# Patient Record
Sex: MALE | Race: WHITE | NOT HISPANIC OR LATINO | Employment: OTHER | URBAN - METROPOLITAN AREA
[De-identification: names, ages, dates, MRNs, and addresses within clinical notes are randomized per-mention and may not be internally consistent; named-entity substitution may affect disease eponyms.]

---

## 2021-04-01 ENCOUNTER — HOSPITAL ENCOUNTER (EMERGENCY)
Facility: HOSPITAL | Age: 61
Discharge: HOME | End: 2021-04-01
Attending: EMERGENCY MEDICINE
Payer: COMMERCIAL

## 2021-04-01 VITALS
OXYGEN SATURATION: 97 % | WEIGHT: 204.38 LBS | HEIGHT: 69 IN | SYSTOLIC BLOOD PRESSURE: 124 MMHG | HEART RATE: 65 BPM | DIASTOLIC BLOOD PRESSURE: 74 MMHG | TEMPERATURE: 98 F | RESPIRATION RATE: 18 BRPM | BODY MASS INDEX: 30.27 KG/M2

## 2021-04-01 DIAGNOSIS — S16.1XXA CERVICAL STRAIN, ACUTE, INITIAL ENCOUNTER: Primary | ICD-10-CM

## 2021-04-01 DIAGNOSIS — S09.90XA HEAD TRAUMA: ICD-10-CM

## 2021-04-01 PROCEDURE — 99284 EMERGENCY DEPT VISIT MOD MDM: CPT | Mod: 25

## 2021-04-01 RX ORDER — CYCLOBENZAPRINE HCL 10 MG
10 TABLET ORAL 3 TIMES DAILY PRN
Qty: 15 TABLET | Refills: 0 | Status: SHIPPED | OUTPATIENT
Start: 2021-04-01 | End: 2021-04-06

## 2021-04-01 RX ORDER — HYDROCODONE BITARTRATE AND ACETAMINOPHEN 5; 325 MG/1; MG/1
1 TABLET ORAL EVERY 6 HOURS PRN
Qty: 12 TABLET | Refills: 0 | Status: SHIPPED | OUTPATIENT
Start: 2021-04-01 | End: 2021-04-11

## 2022-11-04 PROBLEM — Z12.11 SCREENING COLON: Status: ACTIVE | Noted: 2022-11-04

## 2022-12-02 ENCOUNTER — OFFICE (OUTPATIENT)
Dept: URBAN - METROPOLITAN AREA CLINIC 102 | Facility: CLINIC | Age: 62
End: 2022-12-02

## 2022-12-02 ENCOUNTER — HOSPITAL ENCOUNTER (OUTPATIENT)
Dept: RADIOLOGY | Facility: HOSPITAL | Age: 62
Discharge: HOME | End: 2022-12-02
Attending: EMERGENCY MEDICINE
Payer: COMMERCIAL

## 2022-12-02 DIAGNOSIS — M25.561 KNEE PAIN, RIGHT: ICD-10-CM

## 2022-12-02 DIAGNOSIS — M25.561 KNEE PAIN, RIGHT: Primary | ICD-10-CM

## 2022-12-02 PROCEDURE — 73721 MRI JNT OF LWR EXTRE W/O DYE: CPT | Mod: 26,RT,, | Performed by: RADIOLOGY

## 2022-12-02 PROCEDURE — 73721 MRI KNEE WITHOUT CONTRAST RIGHT: ICD-10-PCS | Mod: 26,RT,, | Performed by: RADIOLOGY

## 2022-12-02 PROCEDURE — 00031: CPT | Performed by: INTERNAL MEDICINE

## 2022-12-02 PROCEDURE — 73721 MRI JNT OF LWR EXTRE W/O DYE: CPT | Mod: TC,RT

## 2022-12-05 ENCOUNTER — OFFICE (OUTPATIENT)
Dept: URBAN - METROPOLITAN AREA CLINIC 98 | Facility: CLINIC | Age: 62
End: 2022-12-05

## 2022-12-05 VITALS
DIASTOLIC BLOOD PRESSURE: 82 MMHG | HEART RATE: 77 BPM | TEMPERATURE: 97.3 F | DIASTOLIC BLOOD PRESSURE: 71 MMHG | HEART RATE: 75 BPM | HEART RATE: 70 BPM | SYSTOLIC BLOOD PRESSURE: 113 MMHG | RESPIRATION RATE: 14 BRPM | RESPIRATION RATE: 9 BRPM | HEIGHT: 69 IN | RESPIRATION RATE: 16 BRPM | OXYGEN SATURATION: 100 % | SYSTOLIC BLOOD PRESSURE: 137 MMHG | SYSTOLIC BLOOD PRESSURE: 132 MMHG | SYSTOLIC BLOOD PRESSURE: 131 MMHG | WEIGHT: 200 LBS | SYSTOLIC BLOOD PRESSURE: 154 MMHG | TEMPERATURE: 98.1 F | DIASTOLIC BLOOD PRESSURE: 91 MMHG | OXYGEN SATURATION: 99 % | HEART RATE: 72 BPM | DIASTOLIC BLOOD PRESSURE: 101 MMHG | DIASTOLIC BLOOD PRESSURE: 87 MMHG | HEART RATE: 76 BPM | DIASTOLIC BLOOD PRESSURE: 103 MMHG | HEART RATE: 69 BPM | RESPIRATION RATE: 12 BRPM | OXYGEN SATURATION: 98 %

## 2022-12-05 DIAGNOSIS — Z86.010 PERSONAL HISTORY OF COLONIC POLYPS: ICD-10-CM

## 2023-03-18 ENCOUNTER — HOSPITAL ENCOUNTER (EMERGENCY)
Facility: HOSPITAL | Age: 63
Discharge: HOME | End: 2023-03-18
Attending: EMERGENCY MEDICINE
Payer: COMMERCIAL

## 2023-03-18 VITALS
SYSTOLIC BLOOD PRESSURE: 142 MMHG | TEMPERATURE: 98 F | HEART RATE: 76 BPM | OXYGEN SATURATION: 99 % | HEIGHT: 69 IN | DIASTOLIC BLOOD PRESSURE: 73 MMHG | WEIGHT: 220 LBS | RESPIRATION RATE: 18 BRPM | BODY MASS INDEX: 32.58 KG/M2

## 2023-03-18 DIAGNOSIS — T15.91XA FOREIGN BODY, EYE, RIGHT, INITIAL ENCOUNTER: ICD-10-CM

## 2023-03-18 DIAGNOSIS — Z23 TETANUS-DIPHTHERIA (TD) VACCINATION: ICD-10-CM

## 2023-03-18 DIAGNOSIS — S05.01XA ABRASION OF RIGHT CORNEA, INITIAL ENCOUNTER: Primary | ICD-10-CM

## 2023-03-18 PROCEDURE — 99284 EMERGENCY DEPT VISIT MOD MDM: CPT

## 2023-03-18 PROCEDURE — 25000003 PHARM REV CODE 250: Performed by: EMERGENCY MEDICINE

## 2023-03-18 PROCEDURE — 90471 IMMUNIZATION ADMIN: CPT | Performed by: EMERGENCY MEDICINE

## 2023-03-18 PROCEDURE — 63600175 PHARM REV CODE 636 W HCPCS: Performed by: EMERGENCY MEDICINE

## 2023-03-18 PROCEDURE — 67938 REMOVE EYELID FOREIGN BODY: CPT | Mod: E3

## 2023-03-18 PROCEDURE — 90715 TDAP VACCINE 7 YRS/> IM: CPT | Performed by: EMERGENCY MEDICINE

## 2023-03-18 PROCEDURE — 65205 REMOVE FOREIGN BODY FROM EYE: CPT | Mod: E3

## 2023-03-18 RX ORDER — OFLOXACIN 3 MG/ML
1 SOLUTION/ DROPS OPHTHALMIC 4 TIMES DAILY
Qty: 5 ML | Refills: 0 | Status: SHIPPED | OUTPATIENT
Start: 2023-03-18 | End: 2023-03-18 | Stop reason: SDUPTHER

## 2023-03-18 RX ORDER — OFLOXACIN 3 MG/ML
1 SOLUTION/ DROPS OPHTHALMIC 4 TIMES DAILY
Qty: 5 ML | Refills: 0 | Status: SHIPPED | OUTPATIENT
Start: 2023-03-18

## 2023-03-18 RX ADMIN — FLUORESCEIN SODIUM 1 EACH: 1 STRIP OPHTHALMIC at 09:03

## 2023-03-18 RX ADMIN — TETANUS TOXOID, REDUCED DIPHTHERIA TOXOID AND ACELLULAR PERTUSSIS VACCINE, ADSORBED 0.5 ML: 5; 2.5; 8; 8; 2.5 SUSPENSION INTRAMUSCULAR at 09:03

## 2023-03-19 NOTE — ED PROVIDER NOTES
Encounter Date: 3/18/2023       History     Chief Complaint   Patient presents with    Foreign Body in Eye     Possible alluminum in eye      HPI 62-year-old man who presents emergency department for acute onset of right eye pain while drooling on an aluminum trailer.  He endorses foreign body sensation in the right eye.  Review of patient's allergies indicates:  No Known Allergies  No past medical history on file.  No past surgical history on file.  No family history on file.     Review of Systems   Eyes:  Positive for pain and redness.     Physical Exam     Initial Vitals [03/18/23 2100]   BP Pulse Resp Temp SpO2   (!) 155/85 83 18 98 °F (36.7 °C) 95 %      MAP       --         Physical Exam    Nursing note and vitals reviewed.  Constitutional: He appears well-developed and well-nourished.   HENT:   Head: Normocephalic and atraumatic.   Eyes: EOM are normal. Pupils are equal, round, and reactive to light. Right eye exhibits no chemosis. Foreign body present in the right eye. Right conjunctiva is injected. Right conjunctiva has no hemorrhage.   Slit lamp exam:       The right eye shows corneal abrasion and fluorescein uptake. The right eye shows no corneal ulcer and no hyphema.       Negative Sharon sign   Neck: Neck supple.   Pulmonary/Chest: No respiratory distress.   Musculoskeletal:         General: Normal range of motion.      Cervical back: Neck supple.     Neurological: He is alert and oriented to person, place, and time.   Skin: Skin is warm and dry.       ED Course   Foreign Body    Date/Time: 3/18/2023 9:30 PM  Performed by: Arun Faustin MD  Authorized by: Arun Faustin MD   Body area: eye  Location details: right eyelid    Anesthesia:  Local Anesthetic: proparacaine drops    Patient sedated: no  Patient restrained: no  Localization method: eyelid eversion  Removal mechanism: moist cotton swab  Eye examined with fluorescein.  Fluorescein uptake.  Corneal abrasion size: medium  Corneal abrasion  location: superior  No residual rust ring present.  Dressing: antibiotic drops  Depth: superficial  Complexity: simple  1 objects recovered.  Objects recovered: Aluminum shaving  Post-procedure assessment: foreign body removed  Patient tolerance: Patient tolerated the procedure well with no immediate complications    Labs Reviewed - No data to display       Imaging Results    None          Medications   Tdap (BOOSTRIX) vaccine injection 0.5 mL (has no administration in time range)   fluorescein ophthalmic strip 1 each (1 each Both Eyes Given 3/18/23 2121)     Medical Decision Making:   History:   Old Medical Records: I decided to obtain old medical records.  Initial Assessment:   This is an emergent evaluation for patient presenting to the emergency department with eye pain.    The patient has a corneal abrasion.  The patient has no signs of corneal ulcer, retained foreign body, rust ring, iritis, ruptured globe, or significant visual acuity deficit.  The patient will be treated with antibiotic drops, pain medications and the tetanus will be updated if necessary.  Aluminum shaving was successfully removed.  Patient started on ofloxacin.  The results and physical exam findings were reviewed with the patient. Pt agrees with assessment, disposition and treatment plan and has no further questions or complaints at this time.  Follow-up with ophthalmology in 3-4 days if not improving.  Tetanus was not up-to-date therefore updated in the ED.  Arun Faustin M.D. 9:31 PM 3/18/2023                              Clinical Impression:   Final diagnoses:  [S05.01XA] Abrasion of right cornea, initial encounter (Primary)  [T15.91XA] Foreign body, eye, right, initial encounter  [Z23] Tetanus-diphtheria (Td) vaccination        ED Disposition Condition    Discharge Stable          ED Prescriptions       Medication Sig Dispense Start Date End Date Auth. Provider    ofloxacin (OCUFLOX) 0.3 % ophthalmic solution  (Status:  Discontinued) Place 1 drop into the right eye 4 (four) times daily. 5 mL 3/18/2023 3/18/2023 Arun Faustin MD    ofloxacin (OCUFLOX) 0.3 % ophthalmic solution  (Status: Discontinued) Place 1 drop into the right eye 4 (four) times daily. 5 mL 3/18/2023 3/18/2023 Arun Faustin MD    ofloxacin (OCUFLOX) 0.3 % ophthalmic solution Place 1 drop into the right eye 4 (four) times daily. 5 mL 3/18/2023 -- Arun Faustin MD          Follow-up Information       Follow up With Specialties Details Why Contact Info    [facility] Emergency Dept Emergency Medicine  As needed, If symptoms worsen [address] [place] [id number] [phone]    Alireza Rasheed MD Ophthalmology In 3 days As needed [address] [place] [address] [address]  [facility] - Ophthalmology  [facility] [id number] [phone]               Arun Faustin MD  03/18/23 6868

## 2023-03-19 NOTE — ED NOTES
Pt walked to Registration for Check-Out. D/C instructions and Rx in pt possession along with belongings. No other needs at this time. Pt AAOx4, Abc's intact. NADN. No adverse reaction to medication given.

## 2023-04-18 DIAGNOSIS — M25.561 RIGHT KNEE PAIN, UNSPECIFIED CHRONICITY: Primary | ICD-10-CM

## 2023-04-24 ENCOUNTER — HOSPITAL ENCOUNTER (OUTPATIENT)
Dept: RADIOLOGY | Facility: HOSPITAL | Age: 63
Discharge: HOME | End: 2023-04-24
Attending: ORTHOPAEDIC SURGERY
Payer: COMMERCIAL

## 2023-04-24 ENCOUNTER — OFFICE VISIT (OUTPATIENT)
Dept: ORTHOPEDICS | Facility: CLINIC | Age: 63
End: 2023-04-24
Payer: COMMERCIAL

## 2023-04-24 VITALS — RESPIRATION RATE: 18 BRPM | HEIGHT: 69 IN | BODY MASS INDEX: 32.58 KG/M2 | WEIGHT: 220 LBS

## 2023-04-24 DIAGNOSIS — M25.561 RIGHT KNEE PAIN, UNSPECIFIED CHRONICITY: ICD-10-CM

## 2023-04-24 DIAGNOSIS — M17.10 ARTHRITIS OF KNEE: Primary | ICD-10-CM

## 2023-04-24 PROCEDURE — 73562 X-RAY EXAM OF KNEE 3: CPT | Mod: 26,LT,, | Performed by: RADIOLOGY

## 2023-04-24 PROCEDURE — 73564 X-RAY EXAM KNEE 4 OR MORE: CPT | Mod: 26,RT,, | Performed by: RADIOLOGY

## 2023-04-24 PROCEDURE — 4010F PR ACE/ARB THEARPY RXD/TAKEN: ICD-10-PCS | Mod: CPTII,S$GLB,, | Performed by: ORTHOPAEDIC SURGERY

## 2023-04-24 PROCEDURE — 99204 PR OFFICE/OUTPT VISIT, NEW, LEVL IV, 45-59 MIN: ICD-10-PCS | Mod: S$GLB,,, | Performed by: ORTHOPAEDIC SURGERY

## 2023-04-24 PROCEDURE — 3008F PR BODY MASS INDEX (BMI) DOCUMENTED: ICD-10-PCS | Mod: CPTII,S$GLB,, | Performed by: ORTHOPAEDIC SURGERY

## 2023-04-24 PROCEDURE — 4010F ACE/ARB THERAPY RXD/TAKEN: CPT | Mod: CPTII,S$GLB,, | Performed by: ORTHOPAEDIC SURGERY

## 2023-04-24 PROCEDURE — 73564 XR KNEE ORTHO RIGHT WITH FLEXION: ICD-10-PCS | Mod: 26,RT,, | Performed by: RADIOLOGY

## 2023-04-24 PROCEDURE — 99204 OFFICE O/P NEW MOD 45 MIN: CPT | Mod: S$GLB,,, | Performed by: ORTHOPAEDIC SURGERY

## 2023-04-24 PROCEDURE — 99999 PR PBB SHADOW E&M-EST. PATIENT-LVL II: ICD-10-PCS | Mod: PBBFAC,,, | Performed by: ORTHOPAEDIC SURGERY

## 2023-04-24 PROCEDURE — 3008F BODY MASS INDEX DOCD: CPT | Mod: CPTII,S$GLB,, | Performed by: ORTHOPAEDIC SURGERY

## 2023-04-24 PROCEDURE — 1160F RVW MEDS BY RX/DR IN RCRD: CPT | Mod: CPTII,S$GLB,, | Performed by: ORTHOPAEDIC SURGERY

## 2023-04-24 PROCEDURE — 1159F PR MEDICATION LIST DOCUMENTED IN MEDICAL RECORD: ICD-10-PCS | Mod: CPTII,S$GLB,, | Performed by: ORTHOPAEDIC SURGERY

## 2023-04-24 PROCEDURE — 1160F PR REVIEW ALL MEDS BY PRESCRIBER/CLIN PHARMACIST DOCUMENTED: ICD-10-PCS | Mod: CPTII,S$GLB,, | Performed by: ORTHOPAEDIC SURGERY

## 2023-04-24 PROCEDURE — 73564 X-RAY EXAM KNEE 4 OR MORE: CPT | Mod: TC,PN,RT

## 2023-04-24 PROCEDURE — 1159F MED LIST DOCD IN RCRD: CPT | Mod: CPTII,S$GLB,, | Performed by: ORTHOPAEDIC SURGERY

## 2023-04-24 PROCEDURE — 73562 XR KNEE ORTHO RIGHT WITH FLEXION: ICD-10-PCS | Mod: 26,LT,, | Performed by: RADIOLOGY

## 2023-04-24 PROCEDURE — 99999 PR PBB SHADOW E&M-EST. PATIENT-LVL II: CPT | Mod: PBBFAC,,, | Performed by: ORTHOPAEDIC SURGERY

## 2023-04-24 NOTE — PROGRESS NOTES
History reviewed. No pertinent past medical history.    History reviewed. No pertinent surgical history.    Current Outpatient Medications   Medication Sig    ofloxacin (OCUFLOX) 0.3 % ophthalmic solution Place 1 drop into the right eye 4 (four) times daily.     No current facility-administered medications for this visit.       Review of patient's allergies indicates:  No Known Allergies    History reviewed. No pertinent family history.    Social History     Socioeconomic History    Marital status:        Chief Complaint: No chief complaint on file.      History of present illness:  This is a 63-year-old male seeking a 2nd or 3rd opinion on his right knee.  Patient had a recent surgery by Dr. Lopez with what sounds like a meniscectomy and chondroplasty.  This was on December 27th.  Patient was told he had pretty severe arthritic change.  He then had hyaluronic acid injections with the last 1 being last week.  Has not noticed much of a difference.  Still complains of pain.  Difficulty to squat or bend.  Pain is an 8/10.  Wakes him up at night.    Answers submitted by the patient for this visit:  Orthopedics Questionnaire (Submitted on 4/24/2023)  unexpected weight change: No  appetite change : No  sleep disturbance: Yes  IMMUNOCOMPROMISED: No  nervous/ anxious: No  dysphoric mood: No  rash: No  visual disturbance: No  eye redness: No  eye pain: No  ear pain: No  tinnitus: No  hearing loss: Yes  sinus pressure : No  nosebleeds: No  enviro allergies: No  food allergies: No  cough: No  shortness of breath: No  sweating: No  frequency: No  difficulty urinating: No  hematuria: No  chest pain: No  palpitations: No  nausea: No  vomiting: No  diarrhea: No  blood in stool: No  constipation: No  headaches: No  dizziness: No  numbness: No  seizures: No  joint swelling: No  myalgia: Yes  weakness: Yes  back pain: No   (Submitted on 4/24/2023)  Chief Complaint: Leg pain  Pain Chronicity: new  History of trauma: No  Onset:  more than 1 year ago  Frequency: daily  Progression since onset: unchanged  Injury mechanism: lifting  injury location: exercising  pain- numeric: 5/10  pain location: right knee  pain quality: sharp  Radiating Pain: No  Aggravating factors: extension  fever: No  inability to bear weight: No  itching: No  joint locking: No  limited range of motion: Yes  stiffness: Yes  tingling: No  Treatments tried: exercise  physical therapy: ineffective  Improvement on treatment: no relief      Physical Examination:    Vital Signs:  There were no vitals filed for this visit.    There is no height or weight on file to calculate BMI.    This a well-developed, well nourished patient in no acute distress.  They are alert and oriented and cooperative to examination.  Pt. walks without an antalgic gait.      Examination of the right knee shows no rashes or erythema.  Healed surgical portals There are no masses ecchymosis or effusion. Patient has full range of motion from 0-130°. Patient is nontender to palpation over lateral joint line and moderately tender to palpation over the medial joint line. Patient has a - Lachman exam, - anterior drawer exam, and - posterior drawer exam. - Apley exam. Knee is stable to varus and valgus stress. 5 out of 5 motor strength. Palpable distal pulses. Intact light touch sensation. Negative Patellofemoral crepitus      X-rays:  X-rays of the right knee are ordered and review which show some mild to moderate medial narrowing.  Patient does have what looks like a possible ossific body within the posterior central aspect of his knee on the right     Assessment::  Right knee arthritis    Plan:  I reviewed the findings with him today.  It sounds like the patient has had a knee arthroscopy and hyaluronic acid injections for arthritis.  He is not given the hyaluronic acid injections enough time to really decide whether they worked or not.  I will see him back in a month.  At this point if he is still  symptomatic, we can talk about future treatment including possible PRP or unicompartmental knee replacement.  I would like him to bring the arthroscopic pictures and the op report at his next visit as well.    All previous pertinent notes including ER visits, physical therapy visits, other orthopedic visits as well as other care for the same musculoskeletal problem were reviewed.  All pertinent lab values and previous imaging was reviewed pertinent to the current visit.    This note was created using [employer] voice recognition software that occasionally misinterpreted phrases or words.    Consult note is delivered via Epic messaging service.

## 2023-05-08 ENCOUNTER — TELEPHONE (OUTPATIENT)
Dept: ORTHOPEDICS | Facility: CLINIC | Age: 63
End: 2023-05-08
Payer: COMMERCIAL

## 2023-05-08 DIAGNOSIS — S83.241A ACUTE MEDIAL MENISCAL TEAR, RIGHT, INITIAL ENCOUNTER: Primary | ICD-10-CM

## 2023-05-08 NOTE — TELEPHONE ENCOUNTER
----- Message from Jonathan Rayo MD sent at 5/8/2023 11:26 AM CDT -----  Contact: Jacobo/Corine powell  ----- Message -----  From: Sheree Hawley MA  Sent: 5/5/2023  10:41 AM CDT  To: Jonathan Rayo MD    Pt is requesting a MRI of the knee  ----- Message -----  From: Yogesh Henry MA  Sent: 5/5/2023  10:12 AM CDT  To: Girma Perales Staff    Patient getting worse and wants to know if Dr. Rayo would order an MRI?    Callback number is [phone]

## 2023-05-22 ENCOUNTER — OFFICE VISIT (OUTPATIENT)
Dept: ORTHOPEDICS | Facility: CLINIC | Age: 63
End: 2023-05-22
Payer: COMMERCIAL

## 2023-05-22 VITALS — WEIGHT: 220 LBS | HEIGHT: 69 IN | RESPIRATION RATE: 18 BRPM | BODY MASS INDEX: 32.58 KG/M2

## 2023-05-22 DIAGNOSIS — M25.561 RIGHT KNEE PAIN, UNSPECIFIED CHRONICITY: ICD-10-CM

## 2023-05-22 DIAGNOSIS — S83.241A ACUTE MEDIAL MENISCAL TEAR, RIGHT, INITIAL ENCOUNTER: ICD-10-CM

## 2023-05-22 DIAGNOSIS — M17.10 ARTHRITIS OF KNEE: Primary | ICD-10-CM

## 2023-05-22 PROCEDURE — 1159F MED LIST DOCD IN RCRD: CPT | Mod: CPTII,S$GLB,, | Performed by: ORTHOPAEDIC SURGERY

## 2023-05-22 PROCEDURE — 3008F BODY MASS INDEX DOCD: CPT | Mod: CPTII,S$GLB,, | Performed by: ORTHOPAEDIC SURGERY

## 2023-05-22 PROCEDURE — 3008F PR BODY MASS INDEX (BMI) DOCUMENTED: ICD-10-PCS | Mod: CPTII,S$GLB,, | Performed by: ORTHOPAEDIC SURGERY

## 2023-05-22 PROCEDURE — 99214 PR OFFICE/OUTPT VISIT, EST, LEVL IV, 30-39 MIN: ICD-10-PCS | Mod: S$GLB,,, | Performed by: ORTHOPAEDIC SURGERY

## 2023-05-22 PROCEDURE — 99999 PR PBB SHADOW E&M-EST. PATIENT-LVL III: CPT | Mod: PBBFAC,,, | Performed by: ORTHOPAEDIC SURGERY

## 2023-05-22 PROCEDURE — 99999 PR PBB SHADOW E&M-EST. PATIENT-LVL III: ICD-10-PCS | Mod: PBBFAC,,, | Performed by: ORTHOPAEDIC SURGERY

## 2023-05-22 PROCEDURE — 1160F PR REVIEW ALL MEDS BY PRESCRIBER/CLIN PHARMACIST DOCUMENTED: ICD-10-PCS | Mod: CPTII,S$GLB,, | Performed by: ORTHOPAEDIC SURGERY

## 2023-05-22 PROCEDURE — 1159F PR MEDICATION LIST DOCUMENTED IN MEDICAL RECORD: ICD-10-PCS | Mod: CPTII,S$GLB,, | Performed by: ORTHOPAEDIC SURGERY

## 2023-05-22 PROCEDURE — 4010F PR ACE/ARB THEARPY RXD/TAKEN: ICD-10-PCS | Mod: CPTII,S$GLB,, | Performed by: ORTHOPAEDIC SURGERY

## 2023-05-22 PROCEDURE — 4010F ACE/ARB THERAPY RXD/TAKEN: CPT | Mod: CPTII,S$GLB,, | Performed by: ORTHOPAEDIC SURGERY

## 2023-05-22 PROCEDURE — 99214 OFFICE O/P EST MOD 30 MIN: CPT | Mod: S$GLB,,, | Performed by: ORTHOPAEDIC SURGERY

## 2023-05-22 PROCEDURE — 1160F RVW MEDS BY RX/DR IN RCRD: CPT | Mod: CPTII,S$GLB,, | Performed by: ORTHOPAEDIC SURGERY

## 2023-05-22 NOTE — PROGRESS NOTES
History reviewed. No pertinent past medical history.    History reviewed. No pertinent surgical history.    Current Outpatient Medications   Medication Sig    ofloxacin (OCUFLOX) 0.3 % ophthalmic solution Place 1 drop into the right eye 4 (four) times daily.     No current facility-administered medications for this visit.       Review of patient's allergies indicates:  No Known Allergies    History reviewed. No pertinent family history.    Social History     Socioeconomic History    Marital status:        Chief Complaint:   Chief Complaint   Patient presents with    Right Knee - Pain       History of present illness:  This is a 63-year-old male seeking a 2nd or 3rd opinion on his right knee.  Patient had a recent surgery by Dr. Lopez with what sounds like a meniscectomy and chondroplasty.  This was on December 27th.  Patient was told he had pretty severe arthritic change.  He then had hyaluronic acid injections without any relief.  Has not noticed much of a difference.  Still complains of pain.  Difficulty to squat or bend.  Pain is an 8/10.  Wakes him up at night.    Answers submitted by the patient for this visit:  Orthopedics Questionnaire (Submitted on 4/24/2023)  unexpected weight change: No  appetite change : No  sleep disturbance: Yes  IMMUNOCOMPROMISED: No  nervous/ anxious: No  dysphoric mood: No  rash: No  visual disturbance: No  eye redness: No  eye pain: No  ear pain: No  tinnitus: No  hearing loss: Yes  sinus pressure : No  nosebleeds: No  enviro allergies: No  food allergies: No  cough: No  shortness of breath: No  sweating: No  frequency: No  difficulty urinating: No  hematuria: No  chest pain: No  palpitations: No  nausea: No  vomiting: No  diarrhea: No  blood in stool: No  constipation: No  headaches: No  dizziness: No  numbness: No  seizures: No  joint swelling: No  myalgia: Yes  weakness: Yes  back pain: No   (Submitted on 4/24/2023)  Chief Complaint: Leg pain  Pain Chronicity: new  History  of trauma: No  Onset: more than 1 year ago  Frequency: daily  Progression since onset: unchanged  Injury mechanism: lifting  injury location: exercising  pain- numeric: 5/10  pain location: right knee  pain quality: sharp  Radiating Pain: No  Aggravating factors: extension  fever: No  inability to bear weight: No  itching: No  joint locking: No  limited range of motion: Yes  stiffness: Yes  tingling: No  Treatments tried: exercise  physical therapy: ineffective  Improvement on treatment: no relief      Physical Examination:    Vital Signs:    Vitals:    05/22/23 0831   Resp: 18       Body mass index is 32.49 kg/m².    This a well-developed, well nourished patient in no acute distress.  They are alert and oriented and cooperative to examination.  Pt. walks without an antalgic gait.      Examination of the right knee shows no rashes or erythema.  Healed surgical portals There are no masses ecchymosis or effusion. Patient has full range of motion from 0-130°. Patient is nontender to palpation over lateral joint line and moderately tender to palpation over the medial joint line. Patient has a - Lachman exam, - anterior drawer exam, and - posterior drawer exam. - Apley exam. Knee is stable to varus and valgus stress. 5 out of 5 motor strength. Palpable distal pulses. Intact light touch sensation. Negative Patellofemoral crepitus      X-rays:  X-rays of the right knee are  review which show some mild to moderate medial narrowing.  Patient does have what looks like a possible ossific body within the posterior central aspect of his knee on the right    MRI of the right knee is reviewed and interpreted:8 x 15 mm ossicle corresponding with plain film radiograph of 04/24/2023, posterior to the PCL consistent with ossific body.  Complex tear posterior horn medial meniscus extending into the meniscal root posteriorly.  Full-thickness cartilage loss weight-bearing aspect medial femoral condyle over 4 cm distance with minimal  underlying subchondral edema.  Cartilaginous irregularity at the corresponding tibial plateau.     Assessment::  Right medial compartment knee arthritis    Plan:  I reviewed the findings with him today.  His MRI shows significant medial compartment wear with very little residual posterior horn of the meniscus.  We talked about possible medial  brace versus unicompartmental knee replacement.  Patient would like to proceed with unicompartmental knee replacement.  I gave him information about the procedure and we will go ahead and get him medically cleared.    All previous pertinent notes including ER visits, physical therapy visits, other orthopedic visits as well as other care for the same musculoskeletal problem were reviewed.  All pertinent lab values and previous imaging was reviewed pertinent to the current visit.    This note was created using [employer] voice recognition software that occasionally misinterpreted phrases or words.    Consult note is delivered via Epic messaging service.

## 2023-05-26 ENCOUNTER — PATIENT MESSAGE (OUTPATIENT)
Dept: ORTHOPEDICS | Facility: CLINIC | Age: 63
End: 2023-05-26
Payer: COMMERCIAL

## 2023-05-26 DIAGNOSIS — M17.10 ARTHRITIS OF KNEE: Primary | ICD-10-CM

## 2023-05-26 DIAGNOSIS — M17.11 ARTHRITIS OF RIGHT KNEE: ICD-10-CM

## 2023-05-26 RX ORDER — MUPIROCIN 20 MG/G
OINTMENT TOPICAL
Status: CANCELLED | OUTPATIENT
Start: 2023-05-26

## 2023-05-31 DIAGNOSIS — M17.11 ARTHRITIS OF RIGHT KNEE: Primary | ICD-10-CM

## 2023-05-31 RX ORDER — CELECOXIB 200 MG/1
200 CAPSULE ORAL DAILY
Qty: 30 CAPSULE | Refills: 0 | Status: SHIPPED | OUTPATIENT
Start: 2023-05-31 | End: 2023-06-15

## 2023-06-15 ENCOUNTER — HOSPITAL ENCOUNTER (OUTPATIENT)
Dept: RADIOLOGY | Facility: HOSPITAL | Age: 63
Discharge: HOME | End: 2023-06-15
Attending: ORTHOPAEDIC SURGERY
Payer: COMMERCIAL

## 2023-06-15 ENCOUNTER — HOSPITAL ENCOUNTER (OUTPATIENT)
Dept: PREADMISSION TESTING | Facility: HOSPITAL | Age: 63
Discharge: HOME | End: 2023-06-15
Attending: ORTHOPAEDIC SURGERY
Payer: COMMERCIAL

## 2023-06-15 DIAGNOSIS — M17.10 ARTHRITIS OF KNEE: ICD-10-CM

## 2023-06-15 DIAGNOSIS — M17.11 PRIMARY OSTEOARTHRITIS OF RIGHT KNEE: Primary | ICD-10-CM

## 2023-06-15 DIAGNOSIS — Z01.818 PREOPERATIVE TESTING: Primary | ICD-10-CM

## 2023-06-15 LAB
ABO + RH BLD: NORMAL
ANION GAP SERPL CALC-SCNC: 12 MMOL/L (ref 8–16)
BASOPHILS # BLD AUTO: 0.04 K/UL (ref 0–0.2)
BASOPHILS NFR BLD: 0.7 % (ref 0–1.9)
BLD GP AB SCN CELLS X3 SERPL QL: NORMAL
BUN SERPL-MCNC: 25 MG/DL (ref 8–23)
CALCIUM SERPL-MCNC: 9.7 MG/DL (ref 8.7–10.5)
CHLORIDE SERPL-SCNC: 103 MMOL/L (ref 95–110)
CO2 SERPL-SCNC: 23 MMOL/L (ref 23–29)
CREAT SERPL-MCNC: 1.5 MG/DL (ref 0.5–1.4)
DIFFERENTIAL METHOD: ABNORMAL
EOSINOPHIL # BLD AUTO: 0.2 K/UL (ref 0–0.5)
EOSINOPHIL NFR BLD: 2.4 % (ref 0–8)
ERYTHROCYTE [DISTWIDTH] IN BLOOD BY AUTOMATED COUNT: 12.8 % (ref 11.5–14.5)
EST. GFR  (NO RACE VARIABLE): 52 ML/MIN/1.73 M^2
GLUCOSE SERPL-MCNC: 189 MG/DL (ref 70–110)
HCT VFR BLD AUTO: 42.7 % (ref 40–54)
HGB BLD-MCNC: 14.5 G/DL (ref 14–18)
IMM GRANULOCYTES # BLD AUTO: 0.02 K/UL (ref 0–0.04)
IMM GRANULOCYTES NFR BLD AUTO: 0.3 % (ref 0–0.5)
LYMPHOCYTES # BLD AUTO: 2.2 K/UL (ref 1–4.8)
LYMPHOCYTES NFR BLD: 36.5 % (ref 18–48)
MCH RBC QN AUTO: 32.5 PG (ref 27–31)
MCHC RBC AUTO-ENTMCNC: 34 G/DL (ref 32–36)
MCV RBC AUTO: 96 FL (ref 82–98)
MONOCYTES # BLD AUTO: 0.5 K/UL (ref 0.3–1)
MONOCYTES NFR BLD: 8.5 % (ref 4–15)
NEUTROPHILS # BLD AUTO: 3.2 K/UL (ref 1.8–7.7)
NEUTROPHILS NFR BLD: 51.6 % (ref 38–73)
NRBC BLD-RTO: 0 /100 WBC
PLATELET # BLD AUTO: 179 K/UL (ref 150–450)
PMV BLD AUTO: 11.6 FL (ref 9.2–12.9)
POTASSIUM SERPL-SCNC: 4.6 MMOL/L (ref 3.5–5.1)
RBC # BLD AUTO: 4.46 M/UL (ref 4.6–6.2)
SODIUM SERPL-SCNC: 138 MMOL/L (ref 136–145)
SPECIMEN OUTDATE: NORMAL
WBC # BLD AUTO: 6.13 K/UL (ref 3.9–12.7)

## 2023-06-15 PROCEDURE — 36415 COLL VENOUS BLD VENIPUNCTURE: CPT | Performed by: ORTHOPAEDIC SURGERY

## 2023-06-15 PROCEDURE — 99900103 DSU ONLY-NO CHARGE-INITIAL HR (STAT)

## 2023-06-15 PROCEDURE — 80048 BASIC METABOLIC PNL TOTAL CA: CPT | Performed by: ORTHOPAEDIC SURGERY

## 2023-06-15 PROCEDURE — 73700 CT LOWER EXTREMITY W/O DYE: CPT | Mod: 26,RT,, | Performed by: RADIOLOGY

## 2023-06-15 PROCEDURE — 99900104 DSU ONLY-NO CHARGE-EA ADD'L HR (STAT)

## 2023-06-15 PROCEDURE — 86900 BLOOD TYPING SEROLOGIC ABO: CPT | Performed by: ORTHOPAEDIC SURGERY

## 2023-06-15 PROCEDURE — 73700 CT LOWER EXTREMITY W/O DYE: CPT | Mod: TC,RT

## 2023-06-15 PROCEDURE — 73700 CT KNEE WITHOUT CONTRAST RIGHT W/MAKO PROTOCOL: ICD-10-PCS | Mod: 26,RT,, | Performed by: RADIOLOGY

## 2023-06-15 PROCEDURE — 85025 COMPLETE CBC W/AUTO DIFF WBC: CPT | Performed by: ORTHOPAEDIC SURGERY

## 2023-06-15 PROCEDURE — 87081 CULTURE SCREEN ONLY: CPT | Performed by: ORTHOPAEDIC SURGERY

## 2023-06-15 RX ORDER — ESCITALOPRAM OXALATE 20 MG/1
20 TABLET ORAL
COMMUNITY
Start: 2023-05-23

## 2023-06-15 RX ORDER — ESZOPICLONE 3 MG/1
TABLET, FILM COATED ORAL
COMMUNITY
Start: 2023-06-12

## 2023-06-15 RX ORDER — LISINOPRIL 10 MG/1
10 TABLET ORAL
COMMUNITY

## 2023-06-15 RX ORDER — ALPRAZOLAM 1 MG/1
TABLET ORAL
COMMUNITY
Start: 2023-06-12

## 2023-06-15 RX ORDER — ALLOPURINOL 100 MG/1
100 TABLET ORAL
COMMUNITY

## 2023-06-15 NOTE — DISCHARGE INSTRUCTIONS
To confirm, Your doctor has instructed you that surgery is scheduled for: 6/27/23 with DR. Rayo    Please report to [facility], Registration the morning of surgery. You must check-in and receive a wristband before going to your procedure.    Pre-Op will call the afternoon prior to surgery between 1:00 and 6:00 PM with the final arrival time.  Phone number: [phone]    PLEASE NOTE:  The surgery schedule has many variables which may affect the time of your surgery case.  Family members should be available if your surgery time changes.  Plan to be here the day of your procedure between 4-6 hours.    MEDICATIONS:  TAKE ONLY THESE MEDICATIONS WITH A SMALL SIP OF WATER THE MORNING OF YOUR PROCEDURE:  LEXAPRO, ALLOPURINOL, XANAX IF NEEDED    NO LISINOPRIL MORNING OF SURGERY BUT DO NOT STOP DAYS BEFORE.      DO NOT TAKE THESE MEDICATIONS 5-7 DAYS PRIOR to your procedure or per your surgeon's request:   ASPIRIN, ALEVE, ADVIL, IBUPROFEN, FISH OIL VITAMIN E, HERBALS  (May take Tylenol)    ONLY if you are prescribed any types of blood thinners such as:  Aspirin, Coumadin, Plavix, Pradaxa, Xarelto, Aggrenox, Effient, Eliquis, Savasya, Brilinta, or any other, ask your surgeon whether you should stop taking them and how long before surgery you should stop.  You may also need to verify with the prescribing physician if it is ok to stop your medication.      INSTRUCTIONS IMPORTANT!!  Do not eat or drink anything between midnight and the time of your procedure- this includes gum, mints, and candy.  Do not smoke or drink alcoholic beverages 24 hours prior to your procedure.  Shower the night before AND the morning of your procedure with a Chlorhexidine wash such as Hibiclens or Dial antibacterial soap from the neck down.  Do not get it on your face or in your eyes.  You may use your own shampoo and face wash. This helps your skin to be as bacteria free as possible.    If you wear contact lenses, dentures,  hearing aids or glasses, bring a container to put them in during surgery and give to a family member for safe keeping.  Please leave all jewelry, piercing's and valuables at home. You must remove your false eyelashes prior to surgery.    DO NOT remove hair from the surgery site.  Do not shave the incision site unless you are given specific instructions to do so.    ONLY if you have been diagnosed with sleep apnea please bring your C-PAP machine.  ONLY if you wear home oxygen please bring your portable oxygen tank the day of your procedure.  ONLY if you have a history of OPEN HEART SURGERY you will need a clearance from your Cardiologist per Anesthesia.      ONLY for patients requiring bowel prep, written instructions will be given by your doctor's office.  ONLY if you have a neuro stimulator, please bring the controller with you the morning of surgery  ONLY if a type and screen test is needed before surgery, please return:  If your doctor has scheduled you for an overnight stay, bring a small overnight bag with any personal items you need.  Make arrangements in advance for transportation home by a responsible adult.  It is not safe to drive a vehicle during the 24 hours after anesthesia.      [facility] Visitor Policy    Effective September 26, 2022    [facility] will resume routine visitation for COVID-19 negative patients, including inpatients, outpatients, and procedural areas, in accordance with local campus procedures.    All [facility] facilities and properties are tobacco free.  Smoking is NOT allowed.   If you have any questions about these instructions, call Pre-Op Admit  Nursing at [phone] or the Pre-Op Day Surgery Unit at [phone].

## 2023-06-15 NOTE — PRE ADMISSION SCREENING
Patient Name: Brian Tong  YOB: 1960   MRN: [id number]     [employer]   Basic Mobility Inpatient Short Form 6 Clicks         How much difficulty does the patient currently have  Unable  A Lot  A Little  None      1. Turning over in bed (including adjusting bedclothes, sheets and blankets)?     1 []    2 []    3 [x]    4 []        2. Sitting down on and standing up from a chair with arms (e.g., wheelchair, bedside commode, etc.)     1 []  2 []  3 []     4 [x]      3. Moving from lying on back to sitting on the side of the bed?     1 []  2 []  3 []    4 [x]    How much help from another person does the patient currently need  Total  A Lot  A Little  None      4. Moving to and from a bed to a chair (including a wheelchair)?    1 []  2 []  3 []    4 [x]      5. Need to walk in hospital room?    1 []  2 []  3 []    4 [x]      6. Climbing 3-5 steps with a railing?    1 []  2 []  3 []    4 [x]       Raw Score:      23            CMS 0-100% Score:   11.20         %   Standardized Score:     56.93          CMS Modifier:        CI                                  [employer]   Basic Mobility Inpatient Short Form 6 Clicks Score Conversion Table*         *Use this form to convert -PAC Basic Mobility Inpatient Raw Scores.   [facility] Inpatient Basic Mobility Short Form Scoring Example   1. Add the number values associated with the response to each item. For example, items totals yield a Raw Score of 21.   2. Match the raw score to the t-Scale scores (t-Scale score = 50.25, SE = 4.69).   3. Find the associated CMS % (CMS % = 28.97%).   4. Locate the correct CMS Functional Modifier Code, or G Code (G code = CJ)     NOTE: Each -PAC Short Form has a separate conversion table. Make sure that you use the correct conversion table.       Instruction Manual - page 45 contains conversion table

## 2023-06-15 NOTE — PRE ADMISSION SCREENING
"               [facility] Risk Assessment Scale    Patient Name: Brian Tong  YOB: 1960  MRN: [id number]            RIsk Factor Measure Recommendation Patient Data Scale/Score   BMI >40 Reconsider surgery, weight loss   Estimated body mass index is 32.49 kg/m² as calculated from the following:    Height as of 5/22/23: 5' 9" (1.753 m).    Weight as of 5/22/23: 99.8 kg (220 lb).   [] 0 = 1 - 24.9  [] 1 = 25-29.9  [x] 2 = 30-34.9  [] 3 = 35-39.9  [] 4 = 40-44.9  [] 5 = 45-99.9   Hemoglobin AIC (if applicable) >9 Delay surgery until DM under control  Refer for:  Nutrition Therapy  Exercise   Medication    No results found for: LABA1C, HGBA1C    Lab Results   Component Value Date     (H) 06/15/2023      [] 0 = 4.0-5.6  [] 1 = 5.7-6.4  [] 2 = 6.5-6.9  [] 3 = 7.0-7.9  [] 4 = 8.0-8.9  [] 5 = 9.0-12   Hemoglobin (Anemia) <9 Delay surgery   Correct anemia Lab Results   Component Value Date    HGB 14.5 06/15/2023    [] 20 - <9.0                    Albumin <3 Delay surgery &Workup No results found for: ALBUMIN [] 20 - <3.0   Smoking Cessation >4 Weeks Delay Surgery  Refer to OP Cessation Class    Never Smoker [] 20 - current smoker                                _____ PPD                    Hx of MI, PE, Arrhythmia, CVA, DVT <30 Days Delay Surgery    N/A [] 20      Infection Variable Delay surgery and re-evaluate   N/A [] 20 - recent/current infection     Depression (PHQ) >10 out of 27 Delay Surgery and re-evaluate  Medication  Counseling              [x] 0     []1     []2     []3      []4      [] 5                    (1-4)      (5-9)  (10-14)  (15-19)   (20-27)     Memory Impairment & Memory loss (Mini-Cog Screening Tool) Advanced dementia and/or Parkinson's Reconsider surgery     [x] 0     []1     []2     []3     []4     [] 5     Physical Conditioning (Modified AM-PAC Per Physical Therapy at [facility]) Unable to ambulate on day of surgery Delay surgery and re-evaluate  Pre-Rehabilitation   (PT evaluation)       " [x]  0   []4       []8     []12        []16     []20       (<20%)   (<40%)   (<60%)   (<80% )    (>80%)     Home Environment/Caregiver support  (Per /Navigator Interview)    Availability of basic services and/or approprate assistance during post-operative period Delay surgery and re-evaluate  Safe home environment  Health   1 week post-surgery  Transportation  availability  Ability to obtain DME/Medications post-op    [x] 0     []1     []2     []3     []4     [] 5  [x] 0     []1     []2     []3     []4     [] 5  [x] 0     []1     []2     []3     []4     [] 5  [x] 0     []1     []2     []3     []4     [] 5         MD Contact: Dr. Rayo Comments:  Total Score:  2

## 2023-06-15 NOTE — PRE ADMISSION SCREENING
JOINT CAMP ASSESSMENT    Name Brian Tong   MRN [id number]    Age/Sex 63 y.o. male    Surgeon Dr. Jonathan Rayo   Joint Camp Date 6/15/2023   Surgery Date 6/27/2023   Procedure Right Knee Arthroplasty   Insurance Payor: [facility] / Plan: [facility] OF [place] HMO / Product Type: HMO /    Care Team Patient Care Team:  Primary Doctor No as PCP - General    Pharmacy   [facility] - [place], [place] - [address]  [address] [address]  Phone: [phone] Fax: [phone]     AM-PAC Score   24   Risk Assessment Score 2     Past Medical History:   Diagnosis Date    Diabetes mellitus     Hypertension     Kidney stones        Past Surgical History:   Procedure Laterality Date    right knee surgery           Home Enviroment     Living Arrangement: Lives with spouse  Home Environment: 1-story house/ trailer, elevator, number of outside stairs: 40 with a railing, walk-in shower  Home Safety Concerns: Pets in the home: dogs (3).    DISCHARGE CAREGIVER/SUPPORT SYSTEM     Identified post-op caregiver: Patient has spouse / significant other.  Patient's caregiver(s) will be able to provide physical assistance. Patient will have someone to assist overnight.      Caregiver present at pre-op interview:  Yes      PRE-OPERATIVE FUNCTIONAL STATUS     Employment: Employed full time    Pre-op Functional Status: Patient is independent with mobility/ambulation, transfers, ADL's, IADL's.    Use of assistive device for ambulation: none  ADL: self care  ADL Limitations: difficulty with walking  Medical Restrictions: Unstable ambulation and Decreased range of motions in extremities    POTENTIAL BARRIERS TO DISCHARGE/POTENTIAL POST-OP COMPLICATIONS     Patient with hx of diabetes mellitus, HTN. POSSIBLE SAME DAY DISCHARGE.    DISCHARGE PLAN     Expected LOS of 1 days or less for joint replacement discussed with patient. We also discussed a discharge path of  for approximately two weeks with a  transition to outpatient PT on the third week given no post-op complications.      Patient in agreement with discharge plan: Yes    Discharge to: Discharge home with home health (PT/OT) x2 weeks with transition to outpatient PT     HH:  [facility] ([place], [place]) Patient disclosure form completed and sent to case management for upload to the medical record.      OP PT: [facility] ([place], [place]).     Home DME: none    Needed DME at D/C: rolling walker and bedside commode     Rx: Per Dr. Rayo at discharge     Meds to Beds: Yes  Patient expected to discharge on Aspirin 81mg by mouth twice daily for DVT prophylaxis.

## 2023-06-17 LAB — MRSA SPEC QL CULT: NORMAL

## 2023-06-20 DIAGNOSIS — Z96.651 S/P TOTAL KNEE REPLACEMENT, RIGHT: ICD-10-CM

## 2023-06-20 DIAGNOSIS — M17.11 PRIMARY OSTEOARTHRITIS OF RIGHT KNEE: Primary | ICD-10-CM

## 2023-06-26 ENCOUNTER — ANESTHESIA EVENT (OUTPATIENT)
Dept: SURGERY | Facility: HOSPITAL | Age: 63
End: 2023-06-26
Payer: COMMERCIAL

## 2023-06-26 DIAGNOSIS — M17.11 PRIMARY OSTEOARTHRITIS OF RIGHT KNEE: Primary | ICD-10-CM

## 2023-06-26 RX ORDER — CYCLOBENZAPRINE HCL 10 MG
10 TABLET ORAL 3 TIMES DAILY PRN
Qty: 30 TABLET | Refills: 0 | Status: SHIPPED | OUTPATIENT
Start: 2023-06-26 | End: 2023-07-06

## 2023-06-26 RX ORDER — ONDANSETRON 4 MG/1
4 TABLET, FILM COATED ORAL EVERY 6 HOURS PRN
Qty: 30 TABLET | Refills: 0 | Status: SHIPPED | OUTPATIENT
Start: 2023-06-26

## 2023-06-26 RX ORDER — ACETAMINOPHEN 500 MG
1000 TABLET ORAL EVERY 8 HOURS PRN
Qty: 30 TABLET | Refills: 0 | Status: SHIPPED | OUTPATIENT
Start: 2023-06-26

## 2023-06-26 RX ORDER — OXYCODONE AND ACETAMINOPHEN 5; 325 MG/1; MG/1
1 TABLET ORAL EVERY 6 HOURS PRN
Qty: 28 TABLET | Refills: 0 | Status: SHIPPED | OUTPATIENT
Start: 2023-06-26 | End: 2023-07-03 | Stop reason: SDUPTHER

## 2023-06-26 RX ORDER — ASPIRIN 81 MG/1
81 TABLET ORAL 2 TIMES DAILY
Qty: 56 TABLET | Refills: 0 | Status: SHIPPED | OUTPATIENT
Start: 2023-06-26 | End: 2023-07-24

## 2023-06-26 RX ORDER — IBUPROFEN 600 MG/1
600 TABLET ORAL 3 TIMES DAILY PRN
Qty: 30 TABLET | Refills: 0 | Status: SHIPPED | OUTPATIENT
Start: 2023-06-26

## 2023-06-27 ENCOUNTER — ANESTHESIA (OUTPATIENT)
Dept: SURGERY | Facility: HOSPITAL | Age: 63
End: 2023-06-27
Payer: COMMERCIAL

## 2023-06-27 ENCOUNTER — HOSPITAL ENCOUNTER (OUTPATIENT)
Facility: HOSPITAL | Age: 63
Discharge: HOME | End: 2023-06-27
Attending: ORTHOPAEDIC SURGERY | Admitting: ORTHOPAEDIC SURGERY
Payer: COMMERCIAL

## 2023-06-27 DIAGNOSIS — M17.11 ARTHRITIS OF RIGHT KNEE: ICD-10-CM

## 2023-06-27 DIAGNOSIS — M17.10 ARTHRITIS OF KNEE: ICD-10-CM

## 2023-06-27 PROCEDURE — 71000039 HC RECOVERY, EACH ADD'L HOUR: Performed by: ORTHOPAEDIC SURGERY

## 2023-06-27 PROCEDURE — C1713 ANCHOR/SCREW BN/BN,TIS/BN: HCPCS | Performed by: ORTHOPAEDIC SURGERY

## 2023-06-27 PROCEDURE — 63600175 PHARM REV CODE 636 W HCPCS: Performed by: ORTHOPAEDIC SURGERY

## 2023-06-27 PROCEDURE — 97110 THERAPEUTIC EXERCISES: CPT

## 2023-06-27 PROCEDURE — C1776 JOINT DEVICE (IMPLANTABLE): HCPCS | Performed by: ORTHOPAEDIC SURGERY

## 2023-06-27 PROCEDURE — 27447 TOTAL KNEE ARTHROPLASTY: CPT | Mod: RT,,, | Performed by: ORTHOPAEDIC SURGERY

## 2023-06-27 PROCEDURE — 37000009 HC ANESTHESIA EA ADD 15 MINS: Performed by: ORTHOPAEDIC SURGERY

## 2023-06-27 PROCEDURE — 99900104 DSU ONLY-NO CHARGE-EA ADD'L HR (STAT): Performed by: ORTHOPAEDIC SURGERY

## 2023-06-27 PROCEDURE — 27200750 HC INSULATED NEEDLE/ STIMUPLEX: Performed by: ANESTHESIOLOGY

## 2023-06-27 PROCEDURE — 37000008 HC ANESTHESIA 1ST 15 MINUTES: Performed by: ORTHOPAEDIC SURGERY

## 2023-06-27 PROCEDURE — 25000003 PHARM REV CODE 250: Performed by: ANESTHESIOLOGY

## 2023-06-27 PROCEDURE — D9220A PRA ANESTHESIA: Mod: ANES,,, | Performed by: ANESTHESIOLOGY

## 2023-06-27 PROCEDURE — 27200688 HC TRAY, SPINAL-HYPER/ ISOBARIC: Performed by: ANESTHESIOLOGY

## 2023-06-27 PROCEDURE — 63600175 PHARM REV CODE 636 W HCPCS: Performed by: NURSE ANESTHETIST, CERTIFIED REGISTERED

## 2023-06-27 PROCEDURE — 97116 GAIT TRAINING THERAPY: CPT

## 2023-06-27 PROCEDURE — 0055T PR COMPUTER-ASSIST MUSCSKEL NAVIG, ORTHO PROC, CT/MRI: ICD-10-PCS | Mod: ,,, | Performed by: ORTHOPAEDIC SURGERY

## 2023-06-27 PROCEDURE — D9220A PRA ANESTHESIA: ICD-10-PCS | Mod: CRNA,,, | Performed by: NURSE ANESTHETIST, CERTIFIED REGISTERED

## 2023-06-27 PROCEDURE — 63600175 PHARM REV CODE 636 W HCPCS: Performed by: ANESTHESIOLOGY

## 2023-06-27 PROCEDURE — 25000003 PHARM REV CODE 250: Performed by: ORTHOPAEDIC SURGERY

## 2023-06-27 PROCEDURE — 27201423 OPTIME MED/SURG SUP & DEVICES STERILE SUPPLY: Performed by: ORTHOPAEDIC SURGERY

## 2023-06-27 PROCEDURE — 36000713 HC OR TIME LEV V EA ADD 15 MIN: Performed by: ORTHOPAEDIC SURGERY

## 2023-06-27 PROCEDURE — 99900103 DSU ONLY-NO CHARGE-INITIAL HR (STAT): Performed by: ORTHOPAEDIC SURGERY

## 2023-06-27 PROCEDURE — C1769 GUIDE WIRE: HCPCS | Performed by: ORTHOPAEDIC SURGERY

## 2023-06-27 PROCEDURE — D9220A PRA ANESTHESIA: Mod: CRNA,,, | Performed by: NURSE ANESTHETIST, CERTIFIED REGISTERED

## 2023-06-27 PROCEDURE — 71000015 HC POSTOP RECOV 1ST HR: Performed by: ORTHOPAEDIC SURGERY

## 2023-06-27 PROCEDURE — 36000712 HC OR TIME LEV V 1ST 15 MIN: Performed by: ORTHOPAEDIC SURGERY

## 2023-06-27 PROCEDURE — C9290 INJ, BUPIVACAINE LIPOSOME: HCPCS | Performed by: ANESTHESIOLOGY

## 2023-06-27 PROCEDURE — 64447 NJX AA&/STRD FEMORAL NRV IMG: CPT | Performed by: ANESTHESIOLOGY

## 2023-06-27 PROCEDURE — 71000033 HC RECOVERY, INTIAL HOUR: Performed by: ORTHOPAEDIC SURGERY

## 2023-06-27 PROCEDURE — 27447 PR TOTAL KNEE ARTHROPLASTY: ICD-10-PCS | Mod: RT,,, | Performed by: ORTHOPAEDIC SURGERY

## 2023-06-27 PROCEDURE — 97161 PT EVAL LOW COMPLEX 20 MIN: CPT

## 2023-06-27 PROCEDURE — 25000003 PHARM REV CODE 250: Performed by: NURSE ANESTHETIST, CERTIFIED REGISTERED

## 2023-06-27 PROCEDURE — 94799 UNLISTED PULMONARY SVC/PX: CPT

## 2023-06-27 PROCEDURE — 0055T BONE SRGRY CMPTR CT/MRI IMAG: CPT | Mod: ,,, | Performed by: ORTHOPAEDIC SURGERY

## 2023-06-27 PROCEDURE — D9220A PRA ANESTHESIA: ICD-10-PCS | Mod: ANES,,, | Performed by: ANESTHESIOLOGY

## 2023-06-27 PROCEDURE — 71000016 HC POSTOP RECOV ADDL HR: Performed by: ORTHOPAEDIC SURGERY

## 2023-06-27 DEVICE — BASEPLATE TIB CEM PRIM SZ 4: Type: IMPLANTABLE DEVICE | Site: KNEE | Status: FUNCTIONAL

## 2023-06-27 DEVICE — INSERT TRIATHLON CS TIB 9MM 4: Type: IMPLANTABLE DEVICE | Site: KNEE | Status: FUNCTIONAL

## 2023-06-27 DEVICE — PATELLA TRIATHLON 31X9 SYMTRC: Type: IMPLANTABLE DEVICE | Site: KNEE | Status: FUNCTIONAL

## 2023-06-27 DEVICE — FEMORAL CEMENTED #4 RIGHT: Type: IMPLANTABLE DEVICE | Site: KNEE | Status: FUNCTIONAL

## 2023-06-27 DEVICE — CEMENT BONE ANTIBIO SIMPLEX P: Type: IMPLANTABLE DEVICE | Site: KNEE | Status: FUNCTIONAL

## 2023-06-27 RX ORDER — OXYCODONE HCL 10 MG/1
10 TABLET, FILM COATED, EXTENDED RELEASE ORAL ONCE
Status: COMPLETED | OUTPATIENT
Start: 2023-06-27 | End: 2023-06-27

## 2023-06-27 RX ORDER — FENTANYL CITRATE 50 UG/ML
25 INJECTION, SOLUTION INTRAMUSCULAR; INTRAVENOUS EVERY 5 MIN PRN
Status: DISCONTINUED | OUTPATIENT
Start: 2023-06-27 | End: 2023-06-27 | Stop reason: HOSPADM

## 2023-06-27 RX ORDER — MIDAZOLAM HYDROCHLORIDE 1 MG/ML
2 INJECTION INTRAMUSCULAR; INTRAVENOUS
Status: COMPLETED | OUTPATIENT
Start: 2023-06-27 | End: 2023-06-27

## 2023-06-27 RX ORDER — SODIUM CHLORIDE, SODIUM LACTATE, POTASSIUM CHLORIDE, CALCIUM CHLORIDE 600; 310; 30; 20 MG/100ML; MG/100ML; MG/100ML; MG/100ML
INJECTION, SOLUTION INTRAVENOUS CONTINUOUS
Status: CANCELLED | OUTPATIENT
Start: 2023-06-27

## 2023-06-27 RX ORDER — CELECOXIB 100 MG/1
400 CAPSULE ORAL ONCE
Status: DISCONTINUED | OUTPATIENT
Start: 2023-06-27 | End: 2023-06-27 | Stop reason: HOSPADM

## 2023-06-27 RX ORDER — PROPOFOL 10 MG/ML
VIAL (ML) INTRAVENOUS CONTINUOUS PRN
Status: DISCONTINUED | OUTPATIENT
Start: 2023-06-27 | End: 2023-06-27

## 2023-06-27 RX ORDER — OXYCODONE HYDROCHLORIDE 5 MG/1
5 TABLET ORAL
Status: DISCONTINUED | OUTPATIENT
Start: 2023-06-27 | End: 2023-06-27 | Stop reason: HOSPADM

## 2023-06-27 RX ORDER — EPHEDRINE SULFATE 50 MG/ML
INJECTION, SOLUTION INTRAVENOUS
Status: DISCONTINUED | OUTPATIENT
Start: 2023-06-27 | End: 2023-06-27

## 2023-06-27 RX ORDER — LIDOCAINE HYDROCHLORIDE 20 MG/ML
INJECTION INTRAVENOUS
Status: DISCONTINUED | OUTPATIENT
Start: 2023-06-27 | End: 2023-06-27

## 2023-06-27 RX ORDER — FENTANYL CITRATE 50 UG/ML
100 INJECTION, SOLUTION INTRAMUSCULAR; INTRAVENOUS
Status: COMPLETED | OUTPATIENT
Start: 2023-06-27 | End: 2023-06-27

## 2023-06-27 RX ORDER — ONDANSETRON 2 MG/ML
INJECTION INTRAMUSCULAR; INTRAVENOUS
Status: DISCONTINUED | OUTPATIENT
Start: 2023-06-27 | End: 2023-06-27

## 2023-06-27 RX ORDER — CEFAZOLIN SODIUM 2 G/50ML
2 SOLUTION INTRAVENOUS
Status: COMPLETED | OUTPATIENT
Start: 2023-06-27 | End: 2023-06-27

## 2023-06-27 RX ORDER — BUPIVACAINE HYDROCHLORIDE 5 MG/ML
INJECTION, SOLUTION EPIDURAL; INTRACAUDAL
Status: DISCONTINUED | OUTPATIENT
Start: 2023-06-27 | End: 2023-06-27

## 2023-06-27 RX ORDER — PREGABALIN 75 MG/1
75 CAPSULE ORAL ONCE
Status: COMPLETED | OUTPATIENT
Start: 2023-06-27 | End: 2023-06-27

## 2023-06-27 RX ORDER — BUPIVACAINE HYDROCHLORIDE 7.5 MG/ML
INJECTION, SOLUTION INTRASPINAL
Status: DISCONTINUED | OUTPATIENT
Start: 2023-06-27 | End: 2023-06-27

## 2023-06-27 RX ORDER — PROPOFOL 10 MG/ML
VIAL (ML) INTRAVENOUS
Status: DISCONTINUED | OUTPATIENT
Start: 2023-06-27 | End: 2023-06-27

## 2023-06-27 RX ORDER — METOCLOPRAMIDE HYDROCHLORIDE 5 MG/ML
10 INJECTION INTRAMUSCULAR; INTRAVENOUS EVERY 10 MIN PRN
Status: DISCONTINUED | OUTPATIENT
Start: 2023-06-27 | End: 2023-06-27 | Stop reason: HOSPADM

## 2023-06-27 RX ORDER — NAPROXEN SODIUM 220 MG/1
81 TABLET, FILM COATED ORAL 2 TIMES DAILY
Status: DISCONTINUED | OUTPATIENT
Start: 2023-06-27 | End: 2023-06-27 | Stop reason: HOSPADM

## 2023-06-27 RX ORDER — MUPIROCIN 20 MG/G
OINTMENT TOPICAL
Status: DISCONTINUED | OUTPATIENT
Start: 2023-06-27 | End: 2023-06-27 | Stop reason: HOSPADM

## 2023-06-27 RX ADMIN — OXYCODONE HYDROCHLORIDE 5 MG: 5 TABLET ORAL at 09:06

## 2023-06-27 RX ADMIN — FENTANYL CITRATE 50 MCG: 50 INJECTION, SOLUTION INTRAMUSCULAR; INTRAVENOUS at 07:06

## 2023-06-27 RX ADMIN — SODIUM CHLORIDE, SODIUM GLUCONATE, SODIUM ACETATE, POTASSIUM CHLORIDE AND MAGNESIUM CHLORIDE: 526; 502; 368; 37; 30 INJECTION, SOLUTION INTRAVENOUS at 06:06

## 2023-06-27 RX ADMIN — SODIUM CHLORIDE, SODIUM GLUCONATE, SODIUM ACETATE, POTASSIUM CHLORIDE AND MAGNESIUM CHLORIDE: 526; 502; 368; 37; 30 INJECTION, SOLUTION INTRAVENOUS at 07:06

## 2023-06-27 RX ADMIN — OXYCODONE HYDROCHLORIDE 10 MG: 10 TABLET, FILM COATED, EXTENDED RELEASE ORAL at 06:06

## 2023-06-27 RX ADMIN — MIDAZOLAM 1 MG: 1 INJECTION INTRAMUSCULAR; INTRAVENOUS at 07:06

## 2023-06-27 RX ADMIN — GLYCOPYRROLATE 0.2 MG: 0.2 INJECTION, SOLUTION INTRAMUSCULAR; INTRAVITREAL at 07:06

## 2023-06-27 RX ADMIN — LIDOCAINE HYDROCHLORIDE 50 MG: 20 INJECTION, SOLUTION INTRAVENOUS at 07:06

## 2023-06-27 RX ADMIN — EPHEDRINE SULFATE 5 MG: 50 INJECTION, SOLUTION INTRAMUSCULAR; INTRAVENOUS; SUBCUTANEOUS at 07:06

## 2023-06-27 RX ADMIN — KETOROLAC TROMETHAMINE: 30 INJECTION, SOLUTION INTRAMUSCULAR; INTRAVENOUS at 07:06

## 2023-06-27 RX ADMIN — PREGABALIN 75 MG: 75 CAPSULE ORAL at 06:06

## 2023-06-27 RX ADMIN — TRANEXAMIC ACID 1000 MG: 100 INJECTION, SOLUTION INTRAVENOUS at 07:06

## 2023-06-27 RX ADMIN — BUPIVACAINE 20 ML: 13.3 INJECTION, SUSPENSION, LIPOSOMAL INFILTRATION at 07:06

## 2023-06-27 RX ADMIN — BUPIVACAINE HYDROCHLORIDE IN DEXTROSE 1.2 ML: 7.5 INJECTION, SOLUTION SUBARACHNOID at 07:06

## 2023-06-27 RX ADMIN — BUPIVACAINE HYDROCHLORIDE 10 ML: 5 INJECTION, SOLUTION EPIDURAL; INTRACAUDAL; PERINEURAL at 07:06

## 2023-06-27 RX ADMIN — MUPIROCIN: 20 OINTMENT TOPICAL at 06:06

## 2023-06-27 RX ADMIN — PROPOFOL 100 MG: 10 INJECTION, EMULSION INTRAVENOUS at 07:06

## 2023-06-27 RX ADMIN — CEFAZOLIN SODIUM 2 G: 2 SOLUTION INTRAVENOUS at 07:06

## 2023-06-27 RX ADMIN — ONDANSETRON 4 MG: 2 INJECTION INTRAMUSCULAR; INTRAVENOUS at 09:06

## 2023-06-27 RX ADMIN — PROPOFOL 100 MCG/KG/MIN: 10 INJECTION, EMULSION INTRAVENOUS at 07:06

## 2023-06-27 RX ADMIN — EPHEDRINE SULFATE 10 MG: 50 INJECTION, SOLUTION INTRAMUSCULAR; INTRAVENOUS; SUBCUTANEOUS at 07:06

## 2023-06-27 NOTE — TRANSFER OF CARE
"Anesthesia Transfer of Care Note    Patient: Brian Tong    Procedure(s) Performed: Procedure(s) (LRB):  ROBOTIC ARTHROPLASTY, KNEE, TOTAL (Right)    Patient location: PACU    Anesthesia Type: general    Transport from OR: Transported from OR on 6-10 L/min O2 by face mask with adequate spontaneous ventilation    Post pain: adequate analgesia    Post assessment: no apparent anesthetic complications and tolerated procedure well    Post vital signs: stable    Level of consciousness: awake and responds to stimulation    Nausea/Vomiting: no nausea/vomiting    Complications: none    Transfer of care protocol was followed      Last vitals:   Visit Vitals  /83 (BP Location: Left arm, Patient Position: Lying)   Pulse (!) 52   Temp 36.6 °C (97.8 °F) (Oral)   Resp 14   Ht 5' 9" (1.753 m)   Wt 102.1 kg (225 lb)   SpO2 (!) 93%   BMI 33.23 kg/m²     "

## 2023-06-27 NOTE — DISCHARGE INSTRUCTIONS
"Discharge Instructions: After Your Surgery/Procedure  Youve just had surgery. During surgery you were given medicine called anesthesia to keep you relaxed and free of pain. After surgery you may have some pain or nausea. This is common. Here are some tips for feeling better and getting well after surgery.     Stay on schedule with your medication.   Going home  Your doctor or nurse will show you how to take care of yourself when you go home. He or she will also answer your questions. Have an adult family member or friend drive you home.      For your safety we recommend these precaution for the first 24 hours after your procedure:  Do not drive or use heavy equipment.  Do not make important decisions or sign legal papers.  Do not drink alcohol.  Have someone stay with you, if needed. He or she can watch for problems and help keep you safe.  Your concentration, balance, coordination, and judgement may be impaired for many hours after anesthesia.  Use caution when ambulating or standing up.     You may feel weak and "washed out" after anesthesia and surgery.      Subtle residual effects of general anesthesia or sedation with regional / local anesthesia can last more than 24 hours.  Rest for the remainder of the day or longer if your Doctor/Surgeon has advised you to do so.  Although you may feel normal within the first 24 hours, your reflexes and mental ability may be impaired without you realizing it.  You may feel dizzy, lightheaded or sleepy for 24 hours or longer.      Be sure to go to all follow-up visits with your doctor. And rest after your surgery for as long as your doctor tells you to.  Coping with pain  If you have pain after surgery, pain medicine will help you feel better. Take it as told, before pain becomes severe. Also, ask your doctor or pharmacist about other ways to control pain. This might be with heat, ice, or relaxation. And follow any other instructions your surgeon or nurse gives you.  Tips " for taking pain medicine  To get the best relief possible, remember these points:  Pain medicines can upset your stomach. Taking them with a little food may help.  Most pain relievers taken by mouth need at least 20 to 30 minutes to start to work.  Taking medicine on a schedule can help you remember to take it. Try to time your medicine so that you can take it before starting an activity. This might be before you get dressed, go for a walk, or sit down for dinner.  Constipation is a common side effect of pain medicines. Call your doctor before taking any medicines such as laxatives or stool softeners to help ease constipation. Also ask if you should skip any foods. Drinking lots of fluids and eating foods such as fruits and vegetables that are high in fiber can also help. Remember, do not take laxatives unless your surgeon has prescribed them.  Drinking alcohol and taking pain medicine can cause dizziness and slow your breathing. It can even be deadly. Do not drink alcohol while taking pain medicine.  Pain medicine can make you react more slowly to things. Do not drive or run machinery while taking pain medicine.  Your health care provider may tell you to take acetaminophen to help ease your pain. Ask him or her how much you are supposed to take each day. Acetaminophen or other pain relievers may interact with your prescription medicines or other over-the-counter (OTC) drugs. Some prescription medicines have acetaminophen and other ingredients. Using both prescription and OTC acetaminophen for pain can cause you to overdose. Read the labels on your OTC medicines with care. This will help you to clearly know the list of ingredients, how much to take, and any warnings. It may also help you not take too much acetaminophen. If you have questions or do not understand the information, ask your pharmacist or health care provider to explain it to you before you take the OTC medicine.  Managing nausea  Some people have an  upset stomach after surgery. This is often because of anesthesia, pain, or pain medicine, or the stress of surgery. These tips will help you handle nausea and eat healthy foods as you get better. If you were on a special food plan before surgery, ask your doctor if you should follow it while you get better. These tips may help:  Do not push yourself to eat. Your body will tell you when to eat and how much.  Start off with clear liquids and soup. They are easier to digest.  Next try semi-solid foods, such as mashed potatoes, applesauce, and gelatin, as you feel ready.  Slowly move to solid foods. Dont eat fatty, rich, or spicy foods at first.  Do not force yourself to have 3 large meals a day. Instead eat smaller amounts more often.  Take pain medicines with a small amount of solid food, such as crackers or toast, to avoid nausea.     Call your surgeon if  You still have pain an hour after taking medicine. The medicine may not be strong enough.  You feel too sleepy, dizzy, or groggy. The medicine may be too strong.  You have side effects like nausea, vomiting, or skin changes, such as rash, itching, or hives.       If you have obstructive sleep apnea  You were given anesthesia medicine during surgery to keep you comfortable and free of pain. After surgery, you may have more apnea spells because of this medicine and other medicines you were given. The spells may last longer than usual.   At home:  Keep using the continuous positive airway pressure (CPAP) device when you sleep. Unless your health care provider tells you not to, use it when you sleep, day or night. CPAP is a common device used to treat obstructive sleep apnea.  Talk with your provider before taking any pain medicine, muscle relaxants, or sedatives. Your provider will tell you about the possible dangers of taking these medicines.  © 5265-1917 [employer]. [address] [place] [address], [place] [address]. All rights reserved. This information is  not intended as a substitute for professional medical care. Always follow your healthcare professional's instructions.           Using an Incentive Spirometer    An incentive spirometer is a device that helps you do deep breathing exercises. These exercises expand your lungs, aid in circulation, and help prevent pneumonia. Deep breathing exercises also help you breathe better and improve the function of your lungs by:  Keeping your lungs clear  Strengthening your breathing muscles  Helping prevent respiratory complications or problems  The incentive spirometer gives you a way to take an active part in recover. A nurse or therapist will teach you breathing exercises. To do these exercises, you will breathe in through your mouth and not your nose. The incentive spirometer only works correctly if you breathe in through your mouth.  Steps to clear lungs  Step 1. Exhale normally. Then, inhale normally.  Relax and breathe out.  Step 2. Place your lips tightly around the mouthpiece.  Make sure the device is upright and not tilted.  Step 3. Inhale as much air as you can through the mouthpiece (don't breath through your nose).  Inhale slowly and deeply.  Hold your breath long enough to keep the balls or disk raised for at least 3 to 5 seconds, or as instructed by your healthcare provider.  Some spirometers have an indicator to let you know that you are breathing in too fast. If the indicator goes off, breathe in more slowly.  Step 4. Repeat the exercise regularly.  Do this exercise every hour while you're awake, or as instructed by your healthcare provider.  If you were taught deep breathing and coughing exercises, do them regularly as instructed by your healthcare provider.            Post op instructions for prevention of DVT  What is deep vein thrombosis?  Deep vein thrombosis (DVT) is the medical term for blood clots in the deep veins of the leg.  These blood clots can be dangerous.  A DVT can block a blood vessel and  keep blood from getting where it needs to go.  Another problem is that the clot can travel to other parts of the body such as the lungs.  A clot that travels to the lungs is called a pulmonary embolus (PE) and can cause serious problems with breathing which can lead to death.  Am I at risk for DVT/PE?  If you are not very active, you are at risk of DVT.  Anyone confined to bed, sitting for long periods of time, recovering from surgery, etc. increases the risk of DVT.  Other risk factors are cancer diagnosis, certain medications, estrogen replacement in any form,older age, obesity, pregnancy, smoking, history of clotting disorders, and dehydration.  How will I know if I have a DVT?  Swelling in the lower leg  Pain  Warmth, redness, hardness or bulging of the vein  If you have any of these symptoms, call your doctors office right away.  Some people will not have any symptoms until the clot moves to the lungs.  What are the symptoms of a PE?  Panting, shortness of breath, or trouble breathing  Sharp, knife-like chest pain when you breathe  Coughing or coughing up blood  Rapid heartbeat  If you have any of these symptoms or get worse quickly, call 911 for emergency treatment.  How can I prevent a DVT?  Avoid long periods of inactivity and dont cross your legs--get up and walk around every hour or so.  Stay active--walking after surgery is highly encouraged.  This means you should get out of the house and walk in the neighborhood.  Going up and down stairs will not impair healing (unless advised against such activity by your doctor).    Drink plenty of noncaffeinated, nonalcoholic fluids each day to prevent dehydration.  Wear special support stockings, if they have been advised by your doctor.  If you travel, stop at least once an hour and walk around.  Avoid smoking (assistance with stopping is available through your healthcare provider)  Always notify your doctor if you are not able to follow the post operative  instructions that are given to you at the time of discharge.  It may be necessary to prescribe one of the medications available to prevent DVT.           Exparel(bupivacaine) has been injected to provide approximately 72 hours of reduced pain after your surgery.  Do not remove the bracelet for five days.  Report to your doctor as soon as possible if you experience any of the following:   Restlessness   Anxiety   Speech problems    Lightheadedness   Numbness and tingling of the mouth and lips   Seizures    Metallic taste   Blurred vision   Tremors    Twitching   Depression   Extreme drowsiness  Avoid additional use of local anesthetics (such as dental procedures) for five days (96 hours).           We hope your stay was comfortable as you heal now, mend and rest.    For we have enjoyed taking care of you by giving your our best.    And as you get better, by regaining your health and strength;   We count it as a privilege to have served you and hope your time at [facility] was well spent.      Thank  You!!!

## 2023-06-27 NOTE — H&P
History reviewed. No pertinent past medical history.     History reviewed. No pertinent surgical history.          Current Outpatient Medications   Medication Sig    ofloxacin (OCUFLOX) 0.3 % ophthalmic solution Place 1 drop into the right eye 4 (four) times daily.      No current facility-administered medications for this visit.         Review of patient's allergies indicates:  No Known Allergies     History reviewed. No pertinent family history.     Social History              Socioeconomic History    Marital status:             Chief Complaint:       Chief Complaint   Patient presents with    Right Knee - Pain         History of present illness:  This is a 63-year-old male seeking a 2nd or 3rd opinion on his right knee.  Patient had a recent surgery by Dr. Lopez with what sounds like a meniscectomy and chondroplasty.  This was on December 27th.  Patient was told he had pretty severe arthritic change.  He then had hyaluronic acid injections without any relief.  Has not noticed much of a difference.  Still complains of pain.  Difficulty to squat or bend.  Pain is an 8/10.  Wakes him up at night.     Answers submitted by the patient for this visit:  Orthopedics Questionnaire (Submitted on 4/24/2023)  unexpected weight change: No  appetite change : No  sleep disturbance: Yes  IMMUNOCOMPROMISED: No  nervous/ anxious: No  dysphoric mood: No  rash: No  visual disturbance: No  eye redness: No  eye pain: No  ear pain: No  tinnitus: No  hearing loss: Yes  sinus pressure : No  nosebleeds: No  enviro allergies: No  food allergies: No  cough: No  shortness of breath: No  sweating: No  frequency: No  difficulty urinating: No  hematuria: No  chest pain: No  palpitations: No  nausea: No  vomiting: No  diarrhea: No  blood in stool: No  constipation: No  headaches: No  dizziness: No  numbness: No  seizures: No  joint swelling: No  myalgia: Yes  weakness: Yes  back pain: No   (Submitted on 4/24/2023)  Chief Complaint: Leg  pain  Pain Chronicity: new  History of trauma: No  Onset: more than 1 year ago  Frequency: daily  Progression since onset: unchanged  Injury mechanism: lifting  injury location: exercising  pain- numeric: 5/10  pain location: right knee  pain quality: sharp  Radiating Pain: No  Aggravating factors: extension  fever: No  inability to bear weight: No  itching: No  joint locking: No  limited range of motion: Yes  stiffness: Yes  tingling: No  Treatments tried: exercise  physical therapy: ineffective  Improvement on treatment: no relief        Physical Examination:     Vital Signs:        Vitals:     05/22/23 0831   Resp: 18         Body mass index is 32.49 kg/m².     This a well-developed, well nourished patient in no acute distress.  They are alert and oriented and cooperative to examination.  Pt. walks without an antalgic gait.       Examination of the right knee shows no rashes or erythema.  Healed surgical portals There are no masses ecchymosis or effusion. Patient has full range of motion from 0-130°. Patient is nontender to palpation over lateral joint line and moderately tender to palpation over the medial joint line. Patient has a - Lachman exam, - anterior drawer exam, and - posterior drawer exam. - Apley exam. Knee is stable to varus and valgus stress. 5 out of 5 motor strength. Palpable distal pulses. Intact light touch sensation. Negative Patellofemoral crepitus        X-rays:  X-rays of the right knee are  review which show some mild to moderate medial narrowing.  Patient does have what looks like a possible ossific body within the posterior central aspect of his knee on the right     MRI of the right knee is reviewed and interpreted:8 x 15 mm ossicle corresponding with plain film radiograph of 04/24/2023, posterior to the PCL consistent with ossific body.  Complex tear posterior horn medial meniscus extending into the meniscal root posteriorly.  Full-thickness cartilage loss weight-bearing aspect medial  femoral condyle over 4 cm distance with minimal underlying subchondral edema.  Cartilaginous irregularity at the corresponding tibial plateau.     Assessment::  Right medial compartment knee arthritis     Plan:  I reviewed the findings with him today.  His MRI shows significant medial compartment wear with very little residual posterior horn of the meniscus.  We talked about possible medial  brace versus unicompartmental knee replacement.  Patient would like to proceed with unicompartmental knee replacement.  I gave him information about the procedure and we will go ahead and get him medically cleared.     All previous pertinent notes including ER visits, physical therapy visits, other orthopedic visits as well as other care for the same musculoskeletal problem were reviewed.  All pertinent lab values and previous imaging was reviewed pertinent to the current visit.     This note was created using [employer] voice recognition software that occasionally misinterpreted phrases or words.     Consult note is delivered via Epic messaging service.

## 2023-06-27 NOTE — DISCHARGE SUMMARY
[facility]  Discharge Note  Short Stay    Procedure(s) (LRB):  ROBOTIC ARTHROPLASTY, KNEE, TOTAL (Right)      OUTCOME: Patient tolerated treatment/procedure well without complication and is now ready for discharge.    DISPOSITION: Home or Self Care    FINAL DIAGNOSIS:  <principal problem not specified>    FOLLOWUP: In clinic    DISCHARGE INSTRUCTIONS:    Discharge Procedure Orders   Diet general     Leave dressing on - Keep it clean, dry, and intact until clinic visit     Change dressing (specify)   Order Comments: Dressing change: If dressing loses its seal, change daily.     Call MD for:  temperature >100.4     Call MD for:  persistent nausea and vomiting     Call MD for:  severe uncontrolled pain     Call MD for:  difficulty breathing, headache or visual disturbances     Call MD for:  redness, tenderness, or signs of infection (pain, swelling, redness, odor or green/yellow discharge around incision site)     Call MD for:  hives     Call MD for:  persistent dizziness or light-headedness     Call MD for:  extreme fatigue     Weight bearing as tolerated        TIME SPENT ON DISCHARGE: 5 minutes

## 2023-06-27 NOTE — OP NOTE
[facility]  Orthopedic Surgery  Operative Note    SUMMARY     Date of Procedure: 6/27/2023     Procedure: Procedure(s) (LRB):  ROBOTIC ARTHROPLASTY, KNEE, TOTAL (Right)       Surgeon(s) and Role:     * Jonathan Rayo MD - Primary    Assistant: Salomón RODRIGUEZ    Pre-Operative Diagnosis: Arthritis of knee [M17.10] Right    Post-Operative Diagnosis: Post-Op Diagnosis Codes:     * Arthritis of knee [M17.10] Right    Anesthesia: General/Regional    Complications: No    Estimated Blood Loss (EBL): 20ml           Implants:   Implant Name Type Inv. Item Serial No.  Lot No. LRB No. Used Action   PIN BONE 4 X 140MM STERILE - [id number]  PIN BONE 4 X 140MM STERILE  [employer] [id number] Right 1 Implanted and Explanted   PIN BONE 3.4C116SY - [id number]  PIN BONE [id number]  SYLWIA [employer] JANKI. [id number] Right 1 Implanted and Explanted   CEMENT BONE ANTIBIO SIMPLEX P - [id number]  CEMENT BONE ANTIBIO SIMPLEX P  SYLWIA [employer] JANKI. [id number] Right 1 Implanted   FEMORAL CEMENTED #4 RIGHT - [id number]  FEMORAL CEMENTED #4 RIGHT  [employer] JANKI. [id number] Right 1 Implanted   PATELLA TRIATHLON 31X9 SYMTRC - [id number]  PATELLA TRIATHLON 31X9 SYMTRC  [employer] JANKI. [id number] Right 1 Implanted   BASEPLATE TIB ROSENDO PRIM SZ 4 - [id number]  BASEPLATE TIB ROSENDO PRIM SZ 4  [employer] JANKI. [id number] Right 1 Implanted   INSERT TRIATHLON CS TIB 9MM 4 - [id number]  INSERT TRIATHLON CS TIB 9MM 4  [employer] JANKI. [id number] Right 1 Implanted       Tourniquet time: 55min at 300mmHg    Specimens:   Specimen (24h ago, onward)      None                    Condition: Good    Disposition: PACU - hemodynamically stable.    Attestation: I was present and scrubbed for the entire procedure.    INDICATIONS FOR THE PROCEDURE: A 63M with a history of chronic   Right knee arthritis, had failed all conservative measures including cortisone   injections, PT, viscosupplementation and activity modification. After a long    discussion, the patient wished to proceed with the procedure above.     PROCEDURE IN DETAIL: Risks, benefits and alternatives of the procedure were   explained to the patient including, but not limited to damage to nerves,   arteries or blood vessels. Also explained risk of infection, stiffness, DVT, PE,   polyethylene wear as well as anesthetic complications including seizure, stroke,   heart attack and death, understood this and signed informed consent. The   patient's Right knee was marked prior to coming to the Operating Room. The patient was brought to the operating room, placed on the operating table in a supine position.A  formal timeout was done in which correct patient, procedure and op site were all   correctly identified and confirmed by the entire operating team.  2gm Ancef was given prior to surgical incision. Spinal anesthesia was induced. The patient'sRight  lower extremity was prepped and   draped in normal sterile fashion. TheRight  leg was exsanguinated with an   Esmarch. Tourniquet was inflated up 300 mmHg.  Slightly smaller than typical anterior approach to   the knee was made. Medial parapatellar arthrotomy was made, leaving about 1/8   inch of tissue for later repair.  We then viewed the notch and lateral compartment to check to see the appropriate in his for unicompartmental knee replacement.  Patient had severe arthritis of the trochlea with full-thickness wear of the central trochlea and then a  second full-thickness lesion aiming toward the medial condyle.  Patient also had some moderate wear already of the patella.  It was decided just to proceed with total knee replacement instead of partial.  Proximal medial tibial release was performed,   making sure not to release any of the MCL. We then   everted the patella and reflexed the knee. Fat pad was excised as well as some   of the ACL. Soft tissue off the distal anterior femur was removed for   visualization, so as to help prevent  notching.  We started off by placing our femoral pins.  Two pins were placed about 2 centimeters proximal and 1 centimeter anterior to the medial epicondyle.  We then measured 2 finger breaths below the tibial tubercle and identified the tibial crest.    Two pins were placed just short of bicortically in the tibia.  We then hooked up our arays to our pins.  We placed 2 checkpoints.  One in the tibia and 1 in the femur.  We then took the leg through range of motion.  We then began by marking off our bony points.  We did this 1st on the femur and then on the tibia.  After this we did ligament balancing of the knee 1st extension and then in flexion by using some spoons and an osteotome.  We then adjusted our bone cuts on the computer and once we liked our plan began by making our cuts.  The [employer] robotic assisted cutting arm was then brought in and then we made our femoral cuts and then turned our attention to the tibia.  Tibial cuts were made.  All of bone was removed as well as excess soft tissue.  Posterior osteophytes removed as well.  We then began to trial.  We placed a size 4 femur and a size 4 tibia with a size 9 polyethylene.  We were within 1 millimeter between our medial and lateral compartments in both flexion and extension.        We turned our attention to patella, caliper was used on the patella where it   measured 29. We set guide at 20 and made our 9-mm cut for polyethylene component, 31 was selected. Then drill holes were placed and the patellar button was placed.   This had good tracking. No need for a lateral release and with no hand tests,   it stayed centered the whole time. We then marked our tibial rotation. We then drilled our femoral lugs.  We then   removed everything except the size 4 tibial tray. We then checked our tibial tray   with a drop danette again and then marked our rotation and pinned it into place then   drilled, and then punched for our keel. Robot and pins were all removed. We then  started preparing final   components on the back table. Anterior, medial and lateral structures were injected with our local   Cocktail. Bony surfaces   copiously irrigated with Pulsavac and then thoroughly dried. Once the cement   was the appropriate consistency, first the tibia and then the femur were   cemented into place, removing excess cement. A 9 trial was placed. The knee   was held in compression and then the patella was placed. Cement was allowed to   cure. Once the cement was cured, we then removed excess cement. Again trialed   one final time, again seeing a 9. We then tapped into place the   final 9 meniscal bearing into place. After this was done, we then did our   diluted iodine soak for 3 minutes and then proceeded with closing.  Arthrotomy was closed using our StrataFix.   Subcutaneous tissue was closed using 2-0 Vicryl and skin was using a running 3-0   StrataFix and Dermabond.Instrument, sponge, and needle counts were correct prior to wound closure and at the conclusion of the case.  Sterile dressing was applied.   They were extubated, awakened and transferred from the Operating Room to the   Recovery Room in stable condition.

## 2023-06-27 NOTE — ANESTHESIA PREPROCEDURE EVALUATION
06/27/2023  Brian Tong is a 63 y.o., male.      Pre-op Assessment    I have reviewed the Patient Summary Reports.     I have reviewed the Nursing Notes. I have reviewed the NPO Status.   I have reviewed the Medications.     Review of Systems  Cardiovascular:   Hypertension    Pulmonary:  Pulmonary Normal    Renal/:   Chronic Renal Disease renal calculi    Hepatic/GI:  Hepatic/GI Normal    Musculoskeletal:   knee   Neurological:  Neurology Normal    Endocrine:   Diabetes  Obesity / BMI > 30      Physical Exam  General: Well nourished    Airway:  Mallampati: III / II  Neck ROM: Normal ROM    Dental:  Intact    Chest/Lungs:  Clear to auscultation, Normal Respiratory Rate    Heart:  Rate: Normal  Rhythm: Regular Rhythm        Anesthesia Plan  Type of Anesthesia, risks & benefits discussed:    Anesthesia Type: Spinal, Gen Supraglottic Airway  Intra-op Monitoring Plan: Standard ASA Monitors  Post Op Pain Control Plan: multimodal analgesia, IV/PO Opioids PRN and peripheral nerve block  Induction:  IV  Informed Consent: Informed consent signed with the Patient and all parties understand the risks and agree with anesthesia plan.  All questions answered.   ASA Score: 2    Ready For Surgery From Anesthesia Perspective.     .

## 2023-06-27 NOTE — PLAN OF CARE
Patient arrived to postop at this time.  AAOX3. NAD noted.  Dressing to right knee remains in place with no drainage noted.  Patient with no complaints of pain.  Patient tolerating po intake well with no complaints of nausea/vomiting.  Patient able to move BLE with some numbness noted.  Due to void.

## 2023-06-27 NOTE — PROGRESS NOTES
Criteria met per anesthesia for transfer to phase 2 Tolerating po fluids denies pain Post Xray done Transferred per stretcher to post op Report given to Kristie

## 2023-06-27 NOTE — PLAN OF CARE
Patient voided spontaneously with no problems noted.  Discharge instructions given to pt and family/friend, verbalized understanding and questions answered. Handouts provided. Belongings given back to pt. IV removed- catheter intact. Discharge via wheelchair.

## 2023-06-27 NOTE — PLAN OF CARE
Patient prepared for surgery. Surgical and anesthesia consents signed. Patient belongings in preop. Text message notifications set up with spouse. Call light within reach, bed in lowest position.

## 2023-06-28 VITALS
OXYGEN SATURATION: 100 % | SYSTOLIC BLOOD PRESSURE: 119 MMHG | TEMPERATURE: 97 F | HEIGHT: 69 IN | WEIGHT: 225 LBS | DIASTOLIC BLOOD PRESSURE: 67 MMHG | RESPIRATION RATE: 16 BRPM | HEART RATE: 61 BPM | BODY MASS INDEX: 33.33 KG/M2

## 2023-06-28 PROCEDURE — G0180 PR HOME HEALTH MD CERTIFICATION: ICD-10-PCS | Mod: ,,, | Performed by: ORTHOPAEDIC SURGERY

## 2023-06-28 PROCEDURE — G0180 MD CERTIFICATION HHA PATIENT: HCPCS | Mod: ,,, | Performed by: ORTHOPAEDIC SURGERY

## 2023-06-28 NOTE — ANESTHESIA PROCEDURE NOTES
Spinal    Diagnosis: right knee  Patient location during procedure: OR  Start time: 6/27/2023 7:16 AM  Timeout: 6/27/2023 7:16 AM  End time: 6/27/2023 7:27 AM    Staffing  Authorizing Provider: Rocky Cherry MD  Performing Provider: Rocky Cherry MD    Spinal Block  Patient position: sitting  Prep: ChloraPrep  Patient monitoring: heart rate, cardiac monitor, continuous pulse ox and frequent blood pressure checks  Approach: left paramedian  Location: L2-3  Injection technique: single shot  CSF Fluid: clear free-flowing CSF  Needle  Needle type: Quincke   Needle gauge: 22 G  Needle length: 3.5 in  Additional Documentation: incremental injection, negative aspiration for heme and no paresthesia on injection  Needle localization: anatomical landmarks  Assessment  Sensory level: T10   Dermatomal levels determined by pinch or prick  Ease of block: easy  Patient's tolerance of the procedure: comfortable throughout block and no complaints  Additional Notes  Single pass. No complications.

## 2023-06-28 NOTE — PROGRESS NOTES
Very pleased with care given. All staff were excellent. Will start stool softener.  Having severe pain 8/10 with flexeril, Ibuprofen alternating with percocet and ice. Feels no numbness from block. PT coming to house today.

## 2023-06-28 NOTE — ANESTHESIA POSTPROCEDURE EVALUATION
Anesthesia Post Evaluation    Patient: Brian Tong    Procedure(s) Performed: Procedure(s) (LRB):  ROBOTIC ARTHROPLASTY, KNEE, TOTAL (Right)    Final Anesthesia Type: spinal      Patient location during evaluation: PACU  Patient participation: Yes- Able to Participate  Level of consciousness: sedated and awake  Post-procedure vital signs: reviewed and stable  Pain management: adequate  Airway patency: patent    PONV status at discharge: No PONV  Anesthetic complications: no      Cardiovascular status: blood pressure returned to baseline  Respiratory status: spontaneous ventilation  Hydration status: euvolemic  Follow-up not needed.          Vitals Value Taken Time   /67 06/27/23 1120   Temp 36.3 °C (97.4 °F) 06/27/23 0956   Pulse 61 06/27/23 1120   Resp 16 06/27/23 1120   SpO2 100 % 06/27/23 1120         Event Time   Out of Recovery 10:11:00         Pain/Prashant Score: Pain Rating Prior to Med Admin: 2 (6/27/2023  9:56 AM)  Prashant Score: 10 (6/27/2023 10:00 AM)  Modified Prashant Score: 19 (6/27/2023 11:20 AM)

## 2023-07-03 ENCOUNTER — TELEPHONE (OUTPATIENT)
Dept: ORTHOPEDICS | Facility: CLINIC | Age: 63
End: 2023-07-03
Payer: COMMERCIAL

## 2023-07-03 DIAGNOSIS — M17.11 PRIMARY OSTEOARTHRITIS OF RIGHT KNEE: ICD-10-CM

## 2023-07-03 RX ORDER — OXYCODONE AND ACETAMINOPHEN 5; 325 MG/1; MG/1
1 TABLET ORAL EVERY 6 HOURS PRN
Qty: 28 TABLET | Refills: 0 | Status: SHIPPED | OUTPATIENT
Start: 2023-07-03 | End: 2023-07-05 | Stop reason: SDUPTHER

## 2023-07-03 NOTE — TELEPHONE ENCOUNTER
Called and spoke to patients wife today.  We have sent Rx to patients pharmacy but not sure it was received.  Called pharmacy multiple times but no answer.  Informed her that we will try again on Wed as tomorrow is July 4th and the pharm is closed.      Asa

## 2023-07-05 DIAGNOSIS — M17.11 PRIMARY OSTEOARTHRITIS OF RIGHT KNEE: ICD-10-CM

## 2023-07-05 NOTE — TELEPHONE ENCOUNTER
----- Message from Miley Lubin MA sent at 7/5/2023  9:28 AM CDT -----  Contact: wife, mira  Pain [facility]    Call back

## 2023-07-06 ENCOUNTER — TELEPHONE (OUTPATIENT)
Dept: ORTHOPEDICS | Facility: CLINIC | Age: 63
End: 2023-07-06
Payer: COMMERCIAL

## 2023-07-06 RX ORDER — OXYCODONE AND ACETAMINOPHEN 5; 325 MG/1; MG/1
1 TABLET ORAL EVERY 6 HOURS PRN
Qty: 28 TABLET | Refills: 0 | Status: SHIPPED | OUTPATIENT
Start: 2023-07-06 | End: 2023-07-10 | Stop reason: ALTCHOICE

## 2023-07-06 NOTE — TELEPHONE ENCOUNTER
----- Message from Miley Lubin MA sent at 7/6/2023  8:04 AM CDT -----  Contact: pt  Refill med   Call back      Still does not have pain meds

## 2023-07-07 DIAGNOSIS — M17.11 PRIMARY OSTEOARTHRITIS OF RIGHT KNEE: Primary | ICD-10-CM

## 2023-07-10 ENCOUNTER — HOSPITAL ENCOUNTER (OUTPATIENT)
Dept: RADIOLOGY | Facility: HOSPITAL | Age: 63
Discharge: HOME | End: 2023-07-10
Attending: ORTHOPAEDIC SURGERY
Payer: COMMERCIAL

## 2023-07-10 ENCOUNTER — OFFICE VISIT (OUTPATIENT)
Dept: ORTHOPEDICS | Facility: CLINIC | Age: 63
End: 2023-07-10
Payer: COMMERCIAL

## 2023-07-10 VITALS — WEIGHT: 225 LBS | RESPIRATION RATE: 18 BRPM | HEIGHT: 69 IN | BODY MASS INDEX: 33.33 KG/M2

## 2023-07-10 DIAGNOSIS — Z96.651 S/P TOTAL KNEE REPLACEMENT, RIGHT: Primary | ICD-10-CM

## 2023-07-10 DIAGNOSIS — M17.11 PRIMARY OSTEOARTHRITIS OF RIGHT KNEE: ICD-10-CM

## 2023-07-10 PROCEDURE — 99024 POSTOP FOLLOW-UP VISIT: CPT | Mod: S$GLB,,, | Performed by: ORTHOPAEDIC SURGERY

## 2023-07-10 PROCEDURE — 3008F BODY MASS INDEX DOCD: CPT | Mod: CPTII,S$GLB,, | Performed by: ORTHOPAEDIC SURGERY

## 2023-07-10 PROCEDURE — 4010F ACE/ARB THERAPY RXD/TAKEN: CPT | Mod: CPTII,S$GLB,, | Performed by: ORTHOPAEDIC SURGERY

## 2023-07-10 PROCEDURE — 99999 PR PBB SHADOW E&M-EST. PATIENT-LVL III: CPT | Mod: PBBFAC,,, | Performed by: ORTHOPAEDIC SURGERY

## 2023-07-10 PROCEDURE — 73560 X-RAY EXAM OF KNEE 1 OR 2: CPT | Mod: 26,RT,, | Performed by: RADIOLOGY

## 2023-07-10 PROCEDURE — 1159F PR MEDICATION LIST DOCUMENTED IN MEDICAL RECORD: ICD-10-PCS | Mod: CPTII,S$GLB,, | Performed by: ORTHOPAEDIC SURGERY

## 2023-07-10 PROCEDURE — 1160F PR REVIEW ALL MEDS BY PRESCRIBER/CLIN PHARMACIST DOCUMENTED: ICD-10-PCS | Mod: CPTII,S$GLB,, | Performed by: ORTHOPAEDIC SURGERY

## 2023-07-10 PROCEDURE — 73560 XR KNEE 1 OR 2 VIEW RIGHT: ICD-10-PCS | Mod: 26,RT,, | Performed by: RADIOLOGY

## 2023-07-10 PROCEDURE — 1160F RVW MEDS BY RX/DR IN RCRD: CPT | Mod: CPTII,S$GLB,, | Performed by: ORTHOPAEDIC SURGERY

## 2023-07-10 PROCEDURE — 99999 PR PBB SHADOW E&M-EST. PATIENT-LVL III: ICD-10-PCS | Mod: PBBFAC,,, | Performed by: ORTHOPAEDIC SURGERY

## 2023-07-10 PROCEDURE — 73560 X-RAY EXAM OF KNEE 1 OR 2: CPT | Mod: TC,PO,RT

## 2023-07-10 PROCEDURE — 1159F MED LIST DOCD IN RCRD: CPT | Mod: CPTII,S$GLB,, | Performed by: ORTHOPAEDIC SURGERY

## 2023-07-10 PROCEDURE — 4010F PR ACE/ARB THEARPY RXD/TAKEN: ICD-10-PCS | Mod: CPTII,S$GLB,, | Performed by: ORTHOPAEDIC SURGERY

## 2023-07-10 PROCEDURE — 3008F PR BODY MASS INDEX (BMI) DOCUMENTED: ICD-10-PCS | Mod: CPTII,S$GLB,, | Performed by: ORTHOPAEDIC SURGERY

## 2023-07-10 PROCEDURE — 99024 PR POST-OP FOLLOW-UP VISIT: ICD-10-PCS | Mod: S$GLB,,, | Performed by: ORTHOPAEDIC SURGERY

## 2023-07-10 RX ORDER — OXYCODONE AND ACETAMINOPHEN 10; 325 MG/1; MG/1
1 TABLET ORAL EVERY 6 HOURS PRN
Qty: 28 TABLET | Refills: 0 | Status: SHIPPED | OUTPATIENT
Start: 2023-07-10 | End: 2023-07-24 | Stop reason: SDUPTHER

## 2023-07-10 NOTE — PROGRESS NOTES
Past Medical History:   Diagnosis Date    Diabetes mellitus     Hypertension     Kidney stones        Past Surgical History:   Procedure Laterality Date    right knee surgery      ROBOTIC ARTHROPLASTY, KNEE Right 6/27/2023    Procedure: ROBOTIC ARTHROPLASTY, KNEE, TOTAL;  Surgeon: Jonathan Rayo MD;  Location: [facility];  Service: Orthopedics;  Laterality: Right;       Current Outpatient Medications   Medication Sig    acetaminophen (TYLENOL) 500 MG tablet Take 2 tablets (1,000 mg total) by mouth every 8 (eight) hours as needed for Pain.    allopurinoL (ZYLOPRIM) 100 MG tablet Take 100 mg by mouth.    ALPRAZolam (XANAX) 1 MG tablet TAKE ONE TABLET BY MOUTH TWICE DAILY IF NEEDED FOR ANXIETY FOR 30 DAYS    aspirin (ECOTRIN) 81 MG EC tablet Take 1 tablet (81 mg total) by mouth 2 (two) times a day.    EScitalopram oxalate (LEXAPRO) 20 MG tablet Take 20 mg by mouth.    eszopiclone (LUNESTA) 3 mg Tab TAKE 1 TABLET BY MOUTH DAILY, IMMEDIATELY BEFORE BEDTIME    ibuprofen (ADVIL,MOTRIN) 600 MG tablet Take 1 tablet (600 mg total) by mouth 3 (three) times daily as needed for Pain.    lisinopriL 10 MG tablet Take 10 mg by mouth.    ofloxacin (OCUFLOX) 0.3 % ophthalmic solution Place 1 drop into the right eye 4 (four) times daily.    ondansetron (ZOFRAN) 4 MG tablet Take 1 tablet (4 mg total) by mouth every 6 (six) hours as needed for Nausea.    oxyCODONE-acetaminophen (PERCOCET) 5-325 mg per tablet Take 1 tablet by mouth every 6 (six) hours as needed for Pain.     No current facility-administered medications for this visit.       Review of patient's allergies indicates:  No Known Allergies    History reviewed. No pertinent family history.    Social History     Socioeconomic History    Marital status:    Tobacco Use    Smoking status: Never    Smokeless tobacco: Never   Substance and Sexual Activity    Alcohol use: Yes     Alcohol/week: 2.0 standard drinks     Types: 2 Cans of beer per week     Comment:  occasionally    Drug use: Not Currently       Chief Complaint: No chief complaint on file.      Date of surgery:June 27, 2023    History of present illness: 64 yo male s/p right Singh TKA.  Patient is doing okay.  Complains of a fair amount of pain.  We talked about doing a partial knee replacement on him after he had failed a knee scope for continued pain.  Patient unfortunately had some pretty high-grade chondral loss of the trochlea as well.  Pain is 7/10.  PT is going well as far as mobility.  Still has a fair amount of swelling.      Review of Systems:    Musculoskeletal:  See HPI        Physical Examination:    Vital Signs:  There were no vitals filed for this visit.    There is no height or weight on file to calculate BMI.    This a well-developed, well nourished patient in no acute distress.  They are alert and oriented and cooperative to examination.  Pt. walks with a slow antalgic gait.      Examination of the right knee shows well-healing surgical incision.  No erythema drainage.  Mild swelling.  Range of motion is about 0-100 degrees.  No calf pain.    X-rays:  Two views of the right knee are ordered and reviewed which show well-aligned total knee arthroplasty components without complication     Assessment::s/p Right Singh [place] CR TKA with cement    Plan:  Reviewed the x-rays with him today.  Patient may get the incision wet.  We will transition outpatient physical therapy.  Increased his pain medicine to Percocet 10s.  Follow-up in a month.  Continue to be aggressive as to addressing swelling.    This note was created using [employer] voice recognition software that occasionally misinterpreted phrases or words.

## 2023-07-11 ENCOUNTER — TELEPHONE (OUTPATIENT)
Dept: ORTHOPEDICS | Facility: CLINIC | Age: 63
End: 2023-07-11
Payer: COMMERCIAL

## 2023-07-11 DIAGNOSIS — Z96.651 S/P TOTAL KNEE REPLACEMENT, RIGHT: Primary | ICD-10-CM

## 2023-07-11 NOTE — TELEPHONE ENCOUNTER
----- Message from Gilmar Mercado sent at 7/11/2023 11:03 AM CDT -----  Regarding: needs PT order to a different ficillity, call wife Chasidy   Contact: wife Chasidy   needs PT order to a different ficillity, call wife Chasidy

## 2023-07-18 ENCOUNTER — EXTERNAL HOME HEALTH (OUTPATIENT)
Dept: HOME HEALTH SERVICES | Facility: HOSPITAL | Age: 63
End: 2023-07-18
Payer: COMMERCIAL

## 2023-07-24 DIAGNOSIS — Z96.651 S/P TOTAL KNEE REPLACEMENT, RIGHT: ICD-10-CM

## 2023-07-25 RX ORDER — OXYCODONE AND ACETAMINOPHEN 10; 325 MG/1; MG/1
1 TABLET ORAL EVERY 6 HOURS PRN
Qty: 28 TABLET | Refills: 0 | Status: SHIPPED | OUTPATIENT
Start: 2023-07-25 | End: 2023-08-07 | Stop reason: ALTCHOICE

## 2023-08-07 ENCOUNTER — OFFICE VISIT (OUTPATIENT)
Dept: ORTHOPEDICS | Facility: CLINIC | Age: 63
End: 2023-08-07
Payer: COMMERCIAL

## 2023-08-07 VITALS — BODY MASS INDEX: 33.33 KG/M2 | HEIGHT: 69 IN | WEIGHT: 225 LBS | RESPIRATION RATE: 18 BRPM

## 2023-08-07 DIAGNOSIS — Z96.651 S/P TOTAL KNEE REPLACEMENT, RIGHT: Primary | ICD-10-CM

## 2023-08-07 PROCEDURE — 99999 PR PBB SHADOW E&M-EST. PATIENT-LVL III: ICD-10-PCS | Mod: PBBFAC,,, | Performed by: ORTHOPAEDIC SURGERY

## 2023-08-07 PROCEDURE — 3008F PR BODY MASS INDEX (BMI) DOCUMENTED: ICD-10-PCS | Mod: CPTII,S$GLB,, | Performed by: ORTHOPAEDIC SURGERY

## 2023-08-07 PROCEDURE — 99024 PR POST-OP FOLLOW-UP VISIT: ICD-10-PCS | Mod: S$GLB,,, | Performed by: ORTHOPAEDIC SURGERY

## 2023-08-07 PROCEDURE — 3008F BODY MASS INDEX DOCD: CPT | Mod: CPTII,S$GLB,, | Performed by: ORTHOPAEDIC SURGERY

## 2023-08-07 PROCEDURE — 99024 POSTOP FOLLOW-UP VISIT: CPT | Mod: S$GLB,,, | Performed by: ORTHOPAEDIC SURGERY

## 2023-08-07 PROCEDURE — 1160F PR REVIEW ALL MEDS BY PRESCRIBER/CLIN PHARMACIST DOCUMENTED: ICD-10-PCS | Mod: CPTII,S$GLB,, | Performed by: ORTHOPAEDIC SURGERY

## 2023-08-07 PROCEDURE — 4010F PR ACE/ARB THEARPY RXD/TAKEN: ICD-10-PCS | Mod: CPTII,S$GLB,, | Performed by: ORTHOPAEDIC SURGERY

## 2023-08-07 PROCEDURE — 99999 PR PBB SHADOW E&M-EST. PATIENT-LVL III: CPT | Mod: PBBFAC,,, | Performed by: ORTHOPAEDIC SURGERY

## 2023-08-07 PROCEDURE — 4010F ACE/ARB THERAPY RXD/TAKEN: CPT | Mod: CPTII,S$GLB,, | Performed by: ORTHOPAEDIC SURGERY

## 2023-08-07 PROCEDURE — 1159F MED LIST DOCD IN RCRD: CPT | Mod: CPTII,S$GLB,, | Performed by: ORTHOPAEDIC SURGERY

## 2023-08-07 PROCEDURE — 1159F PR MEDICATION LIST DOCUMENTED IN MEDICAL RECORD: ICD-10-PCS | Mod: CPTII,S$GLB,, | Performed by: ORTHOPAEDIC SURGERY

## 2023-08-07 PROCEDURE — 1160F RVW MEDS BY RX/DR IN RCRD: CPT | Mod: CPTII,S$GLB,, | Performed by: ORTHOPAEDIC SURGERY

## 2023-08-07 RX ORDER — OXYCODONE AND ACETAMINOPHEN 5; 325 MG/1; MG/1
1 TABLET ORAL EVERY 6 HOURS PRN
Qty: 28 TABLET | Refills: 0 | Status: SHIPPED | OUTPATIENT
Start: 2023-08-07

## 2023-08-07 NOTE — PROGRESS NOTES
Past Medical History:   Diagnosis Date    Diabetes mellitus     Hypertension     Kidney stones        Past Surgical History:   Procedure Laterality Date    right knee surgery      ROBOTIC ARTHROPLASTY, KNEE Right 6/27/2023    Procedure: ROBOTIC ARTHROPLASTY, KNEE, TOTAL;  Surgeon: Jonathan Rayo MD;  Location: [facility];  Service: Orthopedics;  Laterality: Right;       Current Outpatient Medications   Medication Sig    acetaminophen (TYLENOL) 500 MG tablet Take 2 tablets (1,000 mg total) by mouth every 8 (eight) hours as needed for Pain.    allopurinoL (ZYLOPRIM) 100 MG tablet Take 100 mg by mouth.    ALPRAZolam (XANAX) 1 MG tablet TAKE ONE TABLET BY MOUTH TWICE DAILY IF NEEDED FOR ANXIETY FOR 30 DAYS    aspirin (ECOTRIN) 81 MG EC tablet Take 1 tablet (81 mg total) by mouth 2 (two) times a day.    EScitalopram oxalate (LEXAPRO) 20 MG tablet Take 20 mg by mouth.    eszopiclone (LUNESTA) 3 mg Tab TAKE 1 TABLET BY MOUTH DAILY, IMMEDIATELY BEFORE BEDTIME    ibuprofen (ADVIL,MOTRIN) 600 MG tablet Take 1 tablet (600 mg total) by mouth 3 (three) times daily as needed for Pain.    lisinopriL 10 MG tablet Take 10 mg by mouth.    ofloxacin (OCUFLOX) 0.3 % ophthalmic solution Place 1 drop into the right eye 4 (four) times daily.    ondansetron (ZOFRAN) 4 MG tablet Take 1 tablet (4 mg total) by mouth every 6 (six) hours as needed for Nausea.    oxyCODONE-acetaminophen (PERCOCET)  mg per tablet Take 1 tablet by mouth every 6 (six) hours as needed for Pain.     No current facility-administered medications for this visit.       Review of patient's allergies indicates:  No Known Allergies    History reviewed. No pertinent family history.    Social History     Socioeconomic History    Marital status:    Tobacco Use    Smoking status: Never    Smokeless tobacco: Never   Substance and Sexual Activity    Alcohol use: Yes     Alcohol/week: 2.0 standard drinks of alcohol     Types: 2 Cans of beer per week      Comment: occasionally    Drug use: Not Currently       Chief Complaint:   Chief Complaint   Patient presents with    Post-op Evaluation       Date of surgery:June 27, 2023    History of present illness: 62 yo male s/p right Singh TKA.  Patient is doing okay.  Complains of a fair amount of pain.  We talked about doing a partial knee replacement on him after he had failed a knee scope for continued pain.  Patient unfortunately had some pretty high-grade chondral loss of the trochlea as well.  Pain is 4/10.  PT is going well as far as mobility.  Still has a small amount of swelling and stiffness.    Review of Systems:    Musculoskeletal:  See HPI        Physical Examination:    Vital Signs:    Vitals:    08/07/23 1323   Resp: 18       Body mass index is 33.23 kg/m².    This a well-developed, well nourished patient in no acute distress.  They are alert and oriented and cooperative to examination.  Pt. walks with a slow antalgic gait.      Examination of the right knee shows well-healed surgical incision.  No erythema drainage.  Mild swelling.  Range of motion is about 0-120 degrees.  No calf pain.    X-rays:  Two views of the right knee are  reviewed which show well-aligned total knee arthroplasty components without complication     Assessment::s/p Right Singh [place] CR TKA with cement    Plan: Continue outpatient physical therapy. Follow-up in 6 weeks with an x-ray of the right knee.  Continue to be aggressive as to addressing swelling.    This note was created using [employer] voice recognition software that occasionally misinterpreted phrases or words.

## 2023-09-12 DIAGNOSIS — Z96.651 S/P TOTAL KNEE REPLACEMENT, RIGHT: Primary | ICD-10-CM

## 2023-09-18 ENCOUNTER — OFFICE VISIT (OUTPATIENT)
Dept: ORTHOPEDICS | Facility: CLINIC | Age: 63
End: 2023-09-18
Payer: COMMERCIAL

## 2023-09-18 ENCOUNTER — HOSPITAL ENCOUNTER (OUTPATIENT)
Dept: RADIOLOGY | Facility: HOSPITAL | Age: 63
Discharge: HOME | End: 2023-09-18
Attending: ORTHOPAEDIC SURGERY
Payer: COMMERCIAL

## 2023-09-18 VITALS — BODY MASS INDEX: 33.33 KG/M2 | HEIGHT: 69 IN | RESPIRATION RATE: 18 BRPM | WEIGHT: 225 LBS

## 2023-09-18 DIAGNOSIS — Z96.651 S/P TOTAL KNEE REPLACEMENT, RIGHT: ICD-10-CM

## 2023-09-18 DIAGNOSIS — Z96.651 S/P TOTAL KNEE REPLACEMENT, RIGHT: Primary | ICD-10-CM

## 2023-09-18 PROCEDURE — 3008F PR BODY MASS INDEX (BMI) DOCUMENTED: ICD-10-PCS | Mod: CPTII,S$GLB,, | Performed by: ORTHOPAEDIC SURGERY

## 2023-09-18 PROCEDURE — 73564 X-RAY EXAM KNEE 4 OR MORE: CPT | Mod: 26,,, | Performed by: RADIOLOGY

## 2023-09-18 PROCEDURE — 99213 PR OFFICE/OUTPT VISIT, EST, LEVL III, 20-29 MIN: ICD-10-PCS | Mod: S$GLB,,, | Performed by: ORTHOPAEDIC SURGERY

## 2023-09-18 PROCEDURE — 1159F MED LIST DOCD IN RCRD: CPT | Mod: CPTII,S$GLB,, | Performed by: ORTHOPAEDIC SURGERY

## 2023-09-18 PROCEDURE — 1159F PR MEDICATION LIST DOCUMENTED IN MEDICAL RECORD: ICD-10-PCS | Mod: CPTII,S$GLB,, | Performed by: ORTHOPAEDIC SURGERY

## 2023-09-18 PROCEDURE — 73564 X-RAY EXAM KNEE 4 OR MORE: CPT | Mod: TC,50,PO

## 2023-09-18 PROCEDURE — 99999 PR PBB SHADOW E&M-EST. PATIENT-LVL III: CPT | Mod: PBBFAC,,, | Performed by: ORTHOPAEDIC SURGERY

## 2023-09-18 PROCEDURE — 4010F PR ACE/ARB THEARPY RXD/TAKEN: ICD-10-PCS | Mod: CPTII,S$GLB,, | Performed by: ORTHOPAEDIC SURGERY

## 2023-09-18 PROCEDURE — 99999 PR PBB SHADOW E&M-EST. PATIENT-LVL III: ICD-10-PCS | Mod: PBBFAC,,, | Performed by: ORTHOPAEDIC SURGERY

## 2023-09-18 PROCEDURE — 99213 OFFICE O/P EST LOW 20 MIN: CPT | Mod: S$GLB,,, | Performed by: ORTHOPAEDIC SURGERY

## 2023-09-18 PROCEDURE — 4010F ACE/ARB THERAPY RXD/TAKEN: CPT | Mod: CPTII,S$GLB,, | Performed by: ORTHOPAEDIC SURGERY

## 2023-09-18 PROCEDURE — 3008F BODY MASS INDEX DOCD: CPT | Mod: CPTII,S$GLB,, | Performed by: ORTHOPAEDIC SURGERY

## 2023-09-18 PROCEDURE — 73564 XR KNEE ORTHO BILAT WITH FLEXION: ICD-10-PCS | Mod: 26,,, | Performed by: RADIOLOGY

## 2023-09-18 NOTE — PROGRESS NOTES
Past Medical History:   Diagnosis Date    Diabetes mellitus     Hypertension     Kidney stones        Past Surgical History:   Procedure Laterality Date    right knee surgery      ROBOTIC ARTHROPLASTY, KNEE Right 6/27/2023    Procedure: ROBOTIC ARTHROPLASTY, KNEE, TOTAL;  Surgeon: Jonathan Rayo MD;  Location: [facility];  Service: Orthopedics;  Laterality: Right;       Current Outpatient Medications   Medication Sig    acetaminophen (TYLENOL) 500 MG tablet Take 2 tablets (1,000 mg total) by mouth every 8 (eight) hours as needed for Pain.    allopurinoL (ZYLOPRIM) 100 MG tablet Take 100 mg by mouth.    ALPRAZolam (XANAX) 1 MG tablet TAKE ONE TABLET BY MOUTH TWICE DAILY IF NEEDED FOR ANXIETY FOR 30 DAYS    aspirin (ECOTRIN) 81 MG EC tablet Take 1 tablet (81 mg total) by mouth 2 (two) times a day.    EScitalopram oxalate (LEXAPRO) 20 MG tablet Take 20 mg by mouth.    eszopiclone (LUNESTA) 3 mg Tab TAKE 1 TABLET BY MOUTH DAILY, IMMEDIATELY BEFORE BEDTIME    ibuprofen (ADVIL,MOTRIN) 600 MG tablet Take 1 tablet (600 mg total) by mouth 3 (three) times daily as needed for Pain.    lisinopriL 10 MG tablet Take 10 mg by mouth.    ofloxacin (OCUFLOX) 0.3 % ophthalmic solution Place 1 drop into the right eye 4 (four) times daily.    ondansetron (ZOFRAN) 4 MG tablet Take 1 tablet (4 mg total) by mouth every 6 (six) hours as needed for Nausea.    oxyCODONE-acetaminophen (PERCOCET) 5-325 mg per tablet Take 1 tablet by mouth every 6 (six) hours as needed for Pain.     No current facility-administered medications for this visit.       Review of patient's allergies indicates:  No Known Allergies    History reviewed. No pertinent family history.    Social History     Socioeconomic History    Marital status:    Tobacco Use    Smoking status: Never    Smokeless tobacco: Never   Substance and Sexual Activity    Alcohol use: Yes     Alcohol/week: 2.0 standard drinks of alcohol     Types: 2 Cans of beer per week     Comment:  occasionally    Drug use: Not Currently       Chief Complaint:   No chief complaint on file.      Date of surgery:June 27, 2023    History of present illness: 62 yo male s/p right Singh TKA.  We talked about doing a partial knee replacement on him after he had failed a knee scope for continued pain.  Patient unfortunately had some pretty high-grade chondral loss of the trochlea as well.  Patient is doing great.  No pain.  Is very pleased.    Review of Systems:  Musculoskeletal:  See HPI    Physical Examination:    Vital Signs:    There were no vitals filed for this visit.      There is no height or weight on file to calculate BMI.    This a well-developed, well nourished patient in no acute distress.  They are alert and oriented and cooperative to examination.  Pt. walks with a slow antalgic gait.      Examination of the right knee shows well-healed surgical incision.  No erythema drainage.  Mild swelling.  Range of motion is about 0-120 degrees.  No calf pain.    X-rays:  4 views of the right knee are ordered and reviewed which show well-aligned right total knee arthroplasty components without complication.  Patient still has the 2 loose bodies in the back of his knee that were present prior to surgery.     Assessment::s/p Right Singh [place] CR TKA with cement    Plan: Continue outpatient physical therapy. Follow-up annually with x-rays of both knees.  Follow-up sooner if needed.    This note was created using [employer] voice recognition software that occasionally misinterpreted phrases or words.

## 2023-09-21 DIAGNOSIS — R31.0 GROSS HEMATURIA: Primary | ICD-10-CM

## 2023-09-22 ENCOUNTER — TELEPHONE (OUTPATIENT)
Dept: FAMILY MEDICINE | Facility: CLINIC | Age: 63
End: 2023-09-22
Payer: COMMERCIAL

## 2023-09-22 ENCOUNTER — TELEPHONE (OUTPATIENT)
Dept: UROLOGY | Facility: CLINIC | Age: 63
End: 2023-09-22
Payer: COMMERCIAL

## 2023-09-22 NOTE — TELEPHONE ENCOUNTER
Pre appt call   Pt scheduled for gross hematuria due to recent ER visit   Inquired about past urological hx pt voiced it has been a while   Pt confirmed appt/informed will need urine upon arrival   Pt vu

## 2023-09-22 NOTE — TELEPHONE ENCOUNTER
Called and spoke to pt about setting up Urology appt p/Referral  Appt set up for 9/26/2023 @ 8;45am w/Dr Marquez in [place]

## 2023-09-26 ENCOUNTER — LAB VISIT (OUTPATIENT)
Dept: LAB | Facility: HOSPITAL | Age: 63
End: 2023-09-26
Attending: UROLOGY
Payer: COMMERCIAL

## 2023-09-26 ENCOUNTER — OFFICE VISIT (OUTPATIENT)
Dept: UROLOGY | Facility: CLINIC | Age: 63
End: 2023-09-26
Payer: COMMERCIAL

## 2023-09-26 VITALS
DIASTOLIC BLOOD PRESSURE: 86 MMHG | HEART RATE: 62 BPM | SYSTOLIC BLOOD PRESSURE: 144 MMHG | BODY MASS INDEX: 32.14 KG/M2 | HEIGHT: 69 IN | WEIGHT: 217 LBS

## 2023-09-26 DIAGNOSIS — R31.0 GROSS HEMATURIA: Primary | ICD-10-CM

## 2023-09-26 DIAGNOSIS — R31.0 GROSS HEMATURIA: ICD-10-CM

## 2023-09-26 LAB
ANION GAP SERPL CALC-SCNC: 12 MMOL/L (ref 8–16)
BILIRUBIN, UA POC OHS: NEGATIVE
BLOOD, UA POC OHS: ABNORMAL
BUN SERPL-MCNC: 21 MG/DL (ref 8–23)
CALCIUM SERPL-MCNC: 9.6 MG/DL (ref 8.7–10.5)
CHLORIDE SERPL-SCNC: 104 MMOL/L (ref 95–110)
CLARITY, UA POC OHS: CLEAR
CO2 SERPL-SCNC: 22 MMOL/L (ref 23–29)
COLOR, UA POC OHS: YELLOW
COMPLEXED PSA SERPL-MCNC: 0.94 NG/ML (ref 0–4)
CREAT SERPL-MCNC: 1.4 MG/DL (ref 0.5–1.4)
EST. GFR  (NO RACE VARIABLE): 56 ML/MIN/1.73 M^2
GLUCOSE SERPL-MCNC: 114 MG/DL (ref 70–110)
GLUCOSE, UA POC OHS: >=1000
KETONES, UA POC OHS: NEGATIVE
LEUKOCYTES, UA POC OHS: NEGATIVE
NITRITE, UA POC OHS: NEGATIVE
PH, UA POC OHS: 5.5
POC RESIDUAL URINE VOLUME: 0 ML (ref 0–100)
POTASSIUM SERPL-SCNC: 4 MMOL/L (ref 3.5–5.1)
PROTEIN, UA POC OHS: ABNORMAL
SODIUM SERPL-SCNC: 138 MMOL/L (ref 136–145)
SPECIFIC GRAVITY, UA POC OHS: 1.02
UROBILINOGEN, UA POC OHS: 0.2

## 2023-09-26 PROCEDURE — 88112 CYTOPATH CELL ENHANCE TECH: CPT | Mod: 26,,, | Performed by: PATHOLOGY

## 2023-09-26 PROCEDURE — 1159F MED LIST DOCD IN RCRD: CPT | Mod: CPTII,S$GLB,, | Performed by: UROLOGY

## 2023-09-26 PROCEDURE — 81003 POCT URINALYSIS(INSTRUMENT): ICD-10-PCS | Mod: QW,S$GLB,, | Performed by: UROLOGY

## 2023-09-26 PROCEDURE — 36415 COLL VENOUS BLD VENIPUNCTURE: CPT | Performed by: UROLOGY

## 2023-09-26 PROCEDURE — 84153 ASSAY OF PSA TOTAL: CPT | Performed by: UROLOGY

## 2023-09-26 PROCEDURE — 3008F BODY MASS INDEX DOCD: CPT | Mod: CPTII,S$GLB,, | Performed by: UROLOGY

## 2023-09-26 PROCEDURE — 87086 URINE CULTURE/COLONY COUNT: CPT | Performed by: UROLOGY

## 2023-09-26 PROCEDURE — 1160F RVW MEDS BY RX/DR IN RCRD: CPT | Mod: CPTII,S$GLB,, | Performed by: UROLOGY

## 2023-09-26 PROCEDURE — 3077F PR MOST RECENT SYSTOLIC BLOOD PRESSURE >= 140 MM HG: ICD-10-PCS | Mod: CPTII,S$GLB,, | Performed by: UROLOGY

## 2023-09-26 PROCEDURE — 1159F PR MEDICATION LIST DOCUMENTED IN MEDICAL RECORD: ICD-10-PCS | Mod: CPTII,S$GLB,, | Performed by: UROLOGY

## 2023-09-26 PROCEDURE — 4010F PR ACE/ARB THEARPY RXD/TAKEN: ICD-10-PCS | Mod: CPTII,S$GLB,, | Performed by: UROLOGY

## 2023-09-26 PROCEDURE — 51798 US URINE CAPACITY MEASURE: CPT | Mod: S$GLB,,, | Performed by: UROLOGY

## 2023-09-26 PROCEDURE — 88112 CYTOPATH CELL ENHANCE TECH: CPT | Performed by: PATHOLOGY

## 2023-09-26 PROCEDURE — 99999 PR PBB SHADOW E&M-EST. PATIENT-LVL IV: ICD-10-PCS | Mod: PBBFAC,,, | Performed by: UROLOGY

## 2023-09-26 PROCEDURE — 4010F ACE/ARB THERAPY RXD/TAKEN: CPT | Mod: CPTII,S$GLB,, | Performed by: UROLOGY

## 2023-09-26 PROCEDURE — 88112 PR  CYTOPATH, CELL ENHANCE TECH: ICD-10-PCS | Mod: 26,,, | Performed by: PATHOLOGY

## 2023-09-26 PROCEDURE — 99204 OFFICE O/P NEW MOD 45 MIN: CPT | Mod: S$GLB,,, | Performed by: UROLOGY

## 2023-09-26 PROCEDURE — 80048 BASIC METABOLIC PNL TOTAL CA: CPT | Performed by: UROLOGY

## 2023-09-26 PROCEDURE — 99999 PR PBB SHADOW E&M-EST. PATIENT-LVL IV: CPT | Mod: PBBFAC,,, | Performed by: UROLOGY

## 2023-09-26 PROCEDURE — 3079F DIAST BP 80-89 MM HG: CPT | Mod: CPTII,S$GLB,, | Performed by: UROLOGY

## 2023-09-26 PROCEDURE — 81003 URINALYSIS AUTO W/O SCOPE: CPT | Mod: QW,S$GLB,, | Performed by: UROLOGY

## 2023-09-26 PROCEDURE — 1160F PR REVIEW ALL MEDS BY PRESCRIBER/CLIN PHARMACIST DOCUMENTED: ICD-10-PCS | Mod: CPTII,S$GLB,, | Performed by: UROLOGY

## 2023-09-26 PROCEDURE — 51798 POCT BLADDER SCAN: ICD-10-PCS | Mod: S$GLB,,, | Performed by: UROLOGY

## 2023-09-26 PROCEDURE — 3077F SYST BP >= 140 MM HG: CPT | Mod: CPTII,S$GLB,, | Performed by: UROLOGY

## 2023-09-26 PROCEDURE — 99204 PR OFFICE/OUTPT VISIT, NEW, LEVL IV, 45-59 MIN: ICD-10-PCS | Mod: S$GLB,,, | Performed by: UROLOGY

## 2023-09-26 PROCEDURE — 3008F PR BODY MASS INDEX (BMI) DOCUMENTED: ICD-10-PCS | Mod: CPTII,S$GLB,, | Performed by: UROLOGY

## 2023-09-26 PROCEDURE — 3079F PR MOST RECENT DIASTOLIC BLOOD PRESSURE 80-89 MM HG: ICD-10-PCS | Mod: CPTII,S$GLB,, | Performed by: UROLOGY

## 2023-09-26 RX ORDER — SEMAGLUTIDE 0.68 MG/ML
INJECTION, SOLUTION SUBCUTANEOUS
COMMUNITY
Start: 2023-09-19

## 2023-09-26 RX ORDER — DAPAGLIFLOZIN 10 MG/1
10 TABLET, FILM COATED ORAL
COMMUNITY
Start: 2023-08-23

## 2023-09-26 RX ORDER — BEMPEDOIC ACID AND EZETIMIBE 180; 10 MG/1; MG/1
1 TABLET, FILM COATED ORAL
COMMUNITY
Start: 2023-08-29

## 2023-09-26 RX ORDER — BLOOD-GLUCOSE SENSOR
EACH MISCELLANEOUS
COMMUNITY
Start: 2023-08-29

## 2023-09-26 NOTE — PROGRESS NOTES
Procedure Order to Urology [[id number]]    Electronically signed by: Lázaro Marquez MD on 09/26/23 0904 Status: Active   Ordering user: Lázaro Marquez MD 09/26/23 0904 Authorized by: Lázaro Marquez MD   Ordering mode: Standard   Frequency:  09/26/23 -     Diagnoses   Gross hematuria [R31.0]   Questionnaire    Question Answer   Procedure Cystoscopy Comment - 10/31   Facility Name: [facility]   Order comments: In requested time put 1pm   ASC, Please order Urine POCT without micro . Local sedation (no urine Dr Sauer or Dr mchugh)

## 2023-09-26 NOTE — PROGRESS NOTES
[facility] Urology New Patient/H&P:     Brian Tong is a 63 y.o. male who presents for evaluation of gross hematuria    About 10 days ago was driving from ST home 10 hrs straight drinking a lot of water and holding his urine and when got home voided and at the end of stream had a few drops of pink urine.    AUA SS: 6/0 (3 urgency, 1 freq, weak stream, sleeping)  Not bothered by urgency or frequency given fluid intake throughout the day.  Otherwise no trouble voiding.    Non/never smoker.  History of kidney stones.  Has been on allopurinol and was seen remotely by Dr. Brunner and has not had a stone many years.  No flank pain  No known family history of kidney, bladder, prostate cancer    Cardiology: Dr Ramirez - managing HTN  Endocrinologist: Dr Gordon - managing DM  - was off DM meds a while, gained weight, went back on - now ozempic and farxiga  Labs 3/27/23 from Dr Ramirez include HbA1C 6.4, Cr 1.35, eGFR 59.  Preop labs from Dr Rayo by 6/15/23 is 1.5 cr/ eGFR 52    Drinks a lot water through day. 2-3 diet cokes per day.    Past Medical History:   Diagnosis Date    Anxiety disorder, unspecified     Diabetes mellitus     Hypertension     Kidney stones        Past Surgical History:   Procedure Laterality Date    right knee surgery      ROBOTIC ARTHROPLASTY, KNEE Right 6/27/2023    Procedure: ROBOTIC ARTHROPLASTY, KNEE, TOTAL;  Surgeon: Jonathan Rayo MD;  Location: [facility];  Service: Orthopedics;  Laterality: Right;       No family history on file.    Social History     Socioeconomic History    Marital status:    Tobacco Use    Smoking status: Never    Smokeless tobacco: Never   Substance and Sexual Activity    Alcohol use: Yes     Alcohol/week: 2.0 standard drinks of alcohol     Types: 2 Cans of beer per week     Comment: occasionally    Drug use: Not Currently       Review of patient's allergies indicates:  No Known Allergies    Medications Reviewed: see MAR    Focused Physical  "Exam    Vitals:    09/26/23 0835   BP: (!) 144/86   Pulse: 62     Body mass index is 32.05 kg/m². Weight: 98.4 kg (217 lb) Height: 5' 9" (175.3 cm)       Abdomen: Soft, non-tender, nondistended, no CVA tenderness    LABS:    Recent Results (from the past 336 hour(s))   POCT Urinalysis(Instrument)    Collection Time: 09/26/23  8:47 AM   Result Value Ref Range    Color, POC UA Yellow Yellow, Straw, Colorless    Clarity, POC UA Clear Clear    Glucose, POC UA >=1000 (A) Negative    Bilirubin, POC UA Negative Negative    Ketones, POC UA Negative Negative    Spec Grav POC UA 1.020 1.005 - 1.030    Blood, POC UA Trace-intact (A) Negative    pH, POC UA 5.5 5.0 - 8.0    Protein, POC UA Trace (A) Negative    Urobilinogen, POC UA 0.2 <=1.0    Nitrite, POC UA Negative Negative    WBC, POC UA Negative Negative   POCT Bladder Scan    Collection Time: 09/26/23  8:47 AM   Result Value Ref Range    POC Residual Urine Volume 0 0 - 100 mL         Assessment/Diagnosis:    1. Gross hematuria  Ambulatory referral/consult to Urology    POCT Urinalysis(Instrument)    POCT Bladder Scan    PSA, Screening    Basic Metabolic Panel    Urine culture    Cytology, Urine    CT Urogram Abd Pelvis W WO    Procedure Order to Urology          Plans:  Reviewed the possible etiologies of inappropriate workup of gross hematuria including urine studies with urine culture and urine cytology, CT urogram for upper tract evaluation, and diagnostic flexible cystourethroscopy for lower tract evaluation.  Procedure in detail described including the use of local anesthesia with xylocaine jelly.  This was scheduled at the [facility] on 10/31/23.  Will update labs today, especially as on review of labs has had some mild CKD 3A with known hypertension and diabetes followed separately by Cardiology and Endocrinology.  Encouraged establishing with primary care.  Will reassess renal function today as well as get a screening PSA, schedule CT urogram, all prior to cystoscopy to " review these.  Otherwise reviewed adequate hydration, minimizing bladder irritants.   Reviewed even with isolated gross hematuria, his urine still has trace blood on dipstick today.  Does have at least 1 risk factor with kidney stone history.  As well by report and starting allopurinol sounds like a history of uric acid stones, which would not be visible on routine screening x-ray, so CT will be definitive for any stone burden as well as upper tract rule out, prior to cystoscopic evaluation    Level of Medical Decision Making  [ _ ]  Low: 2 minor/1 stable chronic/1 acute uncomplicated --- review record/result/order test x2  [ X ]  Mod: 1 chronic exac/2stable chronic/1 acute comp --- review record/result/order test x3 OR independent interp of outside test OR discuss mgmt of outside ordered test --- start drug therapy/plan minor surgery   [ _ ]  High: chronic with exacerbation/progression or trtmnt side effect vs acute/chronic w/ life/body threat ---  (2/3) review record/result/order test x3 OR independent interp of ouutside test OR discuss mgmt of outside ordered test --- drug with monitoring for toxicity, plan major surgery, hospitalize, deescalate/withdraw care bc of prognosis

## 2023-09-27 LAB — BACTERIA UR CULT: NO GROWTH

## 2023-10-02 LAB
FINAL PATHOLOGIC DIAGNOSIS: NORMAL
Lab: NORMAL

## 2023-10-31 ENCOUNTER — HOSPITAL ENCOUNTER (OUTPATIENT)
Facility: HOSPITAL | Age: 63
Discharge: HOME | End: 2023-10-31
Attending: UROLOGY | Admitting: UROLOGY
Payer: COMMERCIAL

## 2023-10-31 DIAGNOSIS — R31.0 GROSS HEMATURIA: ICD-10-CM

## 2023-10-31 DIAGNOSIS — R97.20 ELEVATED PSA: Primary | ICD-10-CM

## 2023-10-31 LAB
BILIRUBIN, UA POC OHS: NEGATIVE
BLOOD, UA POC OHS: NEGATIVE
CLARITY, UA POC OHS: CLEAR
COLOR, UA POC OHS: YELLOW
GLUCOSE, UA POC OHS: NEGATIVE
KETONES, UA POC OHS: NEGATIVE
LEUKOCYTES, UA POC OHS: NEGATIVE
NITRITE, UA POC OHS: NEGATIVE
PH, UA POC OHS: 5.5
PROTEIN, UA POC OHS: NEGATIVE
SPECIFIC GRAVITY, UA POC OHS: >=1.03
UROBILINOGEN, UA POC OHS: 0.2

## 2023-10-31 PROCEDURE — 52000 CYSTOURETHROSCOPY: CPT | Performed by: UROLOGY

## 2023-10-31 PROCEDURE — 52000 PR CYSTOURETHROSCOPY: ICD-10-PCS | Mod: ,,, | Performed by: UROLOGY

## 2023-10-31 PROCEDURE — 52000 CYSTOURETHROSCOPY: CPT | Mod: ,,, | Performed by: UROLOGY

## 2023-10-31 PROCEDURE — 25000003 PHARM REV CODE 250: Performed by: UROLOGY

## 2023-10-31 RX ORDER — LIDOCAINE HYDROCHLORIDE 20 MG/ML
JELLY TOPICAL
Status: DISCONTINUED | OUTPATIENT
Start: 2023-10-31 | End: 2023-10-31 | Stop reason: HOSPADM

## 2023-10-31 RX ORDER — CIPROFLOXACIN 500 MG/1
500 TABLET ORAL 2 TIMES DAILY
Qty: 4 TABLET | Refills: 0 | Status: SHIPPED | OUTPATIENT
Start: 2023-10-31

## 2023-10-31 NOTE — PLAN OF CARE
Discharge instructions given to pt, verbalized understanding.  Refused fluids.  Denies pain.  Prescription sent to pharmacy per dr olvera.   Ambulating out to self care in no distress.

## 2023-10-31 NOTE — H&P
[facility] Urology New Patient/H&P:      Brian Tong is a 63 y.o. male who presents for evaluation of gross hematuria     About 10 days ago was driving from ST home 10 hrs straight drinking a lot of water and holding his urine and when got home voided and at the end of stream had a few drops of pink urine.     AUA SS: 6/0 (3 urgency, 1 freq, weak stream, sleeping)  Not bothered by urgency or frequency given fluid intake throughout the day.  Otherwise no trouble voiding.     Non/never smoker.  History of kidney stones.  Has been on allopurinol and was seen remotely by Dr. Brunner and has not had a stone many years.  No flank pain  No known family history of kidney, bladder, prostate cancer     Cardiology: Dr Ramriez - managing HTN  Endocrinologist: Dr Gordon - managing DM  - was off DM meds a while, gained weight, went back on - now ozempic and farxiga  Labs 3/27/23 from Dr Raimrez include HbA1C 6.4, Cr 1.35, eGFR 59.  Preop labs from Dr Rayo by 6/15/23 is 1.5 cr/ eGFR 52     Drinks a lot water through day. 2-3 diet cokes per day.          Past Medical History:   Diagnosis Date    Anxiety disorder, unspecified      Diabetes mellitus      Hypertension      Kidney stones                 Past Surgical History:   Procedure Laterality Date    right knee surgery        ROBOTIC ARTHROPLASTY, KNEE Right 6/27/2023     Procedure: ROBOTIC ARTHROPLASTY, KNEE, TOTAL;  Surgeon: Jonathan Rayo MD;  Location: [facility];  Service: Orthopedics;  Laterality: Right;         No family history on file.     Social History               Socioeconomic History    Marital status:    Tobacco Use    Smoking status: Never    Smokeless tobacco: Never   Substance and Sexual Activity    Alcohol use: Yes       Alcohol/week: 2.0 standard drinks of alcohol       Types: 2 Cans of beer per week       Comment: occasionally    Drug use: Not Currently            Review of patient's allergies indicates:  No Known Allergies    "  Medications Reviewed: see MAR     Focused Physical Exam         Vitals:     09/26/23 0835   BP: (!) 144/86   Pulse: 62      Body mass index is 32.05 kg/m². Weight: 98.4 kg (217 lb) Height: 5' 9" (175.3 cm)         Abdomen: Soft, non-tender, nondistended, no CVA tenderness     LABS:     Recent Results         Recent Results (from the past 336 hour(s))   POCT Urinalysis(Instrument)     Collection Time: 09/26/23  8:47 AM   Result Value Ref Range     Color, POC UA Yellow Yellow, Straw, Colorless     Clarity, POC UA Clear Clear     Glucose, POC UA >=1000 (A) Negative     Bilirubin, POC UA Negative Negative     Ketones, POC UA Negative Negative     Spec Grav POC UA 1.020 1.005 - 1.030     Blood, POC UA Trace-intact (A) Negative     pH, POC UA 5.5 5.0 - 8.0     Protein, POC UA Trace (A) Negative     Urobilinogen, POC UA 0.2 <=1.0     Nitrite, POC UA Negative Negative     WBC, POC UA Negative Negative   POCT Bladder Scan     Collection Time: 09/26/23  8:47 AM   Result Value Ref Range     POC Residual Urine Volume 0 0 - 100 mL               Assessment/Diagnosis:     1. Gross hematuria  Ambulatory referral/consult to Urology     POCT Urinalysis(Instrument)     POCT Bladder Scan     PSA, Screening     Basic Metabolic Panel     Urine culture     Cytology, Urine     CT Urogram Abd Pelvis W WO     Procedure Order to Urology             Plans:  Reviewed the possible etiologies of inappropriate workup of gross hematuria including urine studies with urine culture and urine cytology, CT urogram for upper tract evaluation, and diagnostic flexible cystourethroscopy for lower tract evaluation.  Procedure in detail described including the use of local anesthesia with xylocaine jelly.  This was scheduled at the [facility] on 10/31/23.  Will update labs today, especially as on review of labs has had some mild CKD 3A with known hypertension and diabetes followed separately by Cardiology and Endocrinology.  Encouraged establishing with primary " care.  Will reassess renal function today as well as get a screening PSA, schedule CT urogram, all prior to cystoscopy to review these.  Otherwise reviewed adequate hydration, minimizing bladder irritants.   Reviewed even with isolated gross hematuria, his urine still has trace blood on dipstick today.  Does have at least 1 risk factor with kidney stone history.  As well by report and starting allopurinol sounds like a history of uric acid stones, which would not be visible on routine screening x-ray, so CT will be definitive for any stone burden as well as upper tract rule out, prior to cystoscopic evaluation       Culture and cytol negative  No showed CTU 10/16  Here for cysto  Will proceed for lower tract eval and r/s CT to eval stones

## 2023-11-01 VITALS
HEIGHT: 69 IN | HEART RATE: 70 BPM | RESPIRATION RATE: 18 BRPM | DIASTOLIC BLOOD PRESSURE: 81 MMHG | BODY MASS INDEX: 32.13 KG/M2 | OXYGEN SATURATION: 93 % | SYSTOLIC BLOOD PRESSURE: 135 MMHG | TEMPERATURE: 98 F | WEIGHT: 216.94 LBS

## 2023-11-05 NOTE — OP NOTE
[facility] Urology Operative/Brief Discharge Note     Date: 10/31/23     Staff Surgeon: Lázaro Marquez MD     Pre-Op Diagnosis:   Gross hematuria     Post-Op Diagnosis:   same     Procedure(s) Performed:   Cystoscopy, flexible    Specimen(s): none     Anesthesia: local, 2% xylocaine jelly urojet     Findings:   Distinct narrow bulbar urethral stricture, moderate segment with overlapping scarring seen as if 2 concentric narrowings or 1 longer segment stricture though less than 0.5 cm estimated, in mid bulbar urethra, approximately less than 16 [place] but able to be passively dilated with slow passage of cystoscope with passive dilation seen of it on withdrawal of scope as 1 slightly wider contiguous stricture.  Moderate lateral lobe ingrowth of bilateral lateral lobes without complete obstruction, approximately 75-80% obstructed.  Mildly trabeculated bladder.  No intravesical extension of prostate.  Hypervascularity over impression of prostate at base of bladder.        Estimated Blood Loss: none     Drains: none     Complications: none     Indications for procedure:  63-year-old male who recently presented for evaluation of gross hematuria, isolated of terminal hematuria with a few drops of pink urine and blood in urine after a 10 hour drive.  No other noted trouble voiding with AUA symptom score 6/0 with mild urgency but only 1/5 weak stream.  Does have history of kidney stones and has not had a stone many years on allopurinol was not had evaluation in many years.  Urine studies negative, did not complete CT urogram (no-show/notes that he forgot) and presents today for cystoscopic evaluation.  Had trace blood on U dip at clinic visit at time of evaluation, and urinalysis dipstick today is negative      Procedure in detail:  After informed consent, the patient was placed in supine position and prepped and drapped in standard cystoscopic fashion and 2% xylocaine jelly was instilled into the urethra.  A flexible  cystoscope was passed into the bladder via the urethra.      Anterior urethra normal until point of narrowing/stricture in the bulbar urethra as described above.  Did appear less than 16 [place] but gentle passage of the cystoscope was able to pass through and passively dilate this stricture and lyse some areas of it which was seen on withdrawing the scope from the bladder later. The prostatic urethra demonstrated moderate obstruction as described above in findings, with lateral lobe obstruction present with the absence of median lobe, as confirmed on retroflexion      Bladder otherwise systematically inspected and no tumors or mucosal lesions seen.  Bladder was also assessed for signs of chronic obstruction such as trabeculations, cellules, diverticulum, of which pertinent findings are noted above with extensive signs of chronic obstruction.  Bilateral ureteral orifices seen in orthotopic position on trigone bilaterally with clear efflux.       Patient tolerated the procedure well. No complications     Disposition:  Upon finding of stricture during cystoscopy discussed with patient management such as concurrent urethral dilation.  However advised if was going to be managed should have more formal management secondary to its appearance and multilayered longer segment, likely to recur after formal dilation, noted to have some passive dilation.  We did then have discussion after the procedure about management of urethral stricture disease, noting options of observation, dilation, DVIU, urethroplasty.  He denies any difficulty voiding at all, and it is reasonable that some of his prostatic hypervascularity and this stricture are the reason for his isolated gross hematuria in the absence of other negative workup, however we do not have upper tract imaging, and can not rule out any stone or stone contribution.  In discussing urethral stricture disease he did then note he had a procedure for scar tissue in the penis when  he was about 10 years old and asked if this could be the same thing, certainly it could.  As he denies any STI history, and has this history of pediatric possible urethral stricture management.    As he is completely asymptomatic from a voiding standpoint and has had no recurrent hematuria, discuss observation at this time.  Risk benefit discussion about Flomax noting mild prostate enlargement, urethral stricture, however has no voiding issues, so would hold off at this time, and noted the potential especially after instrumentation for worsening of stricture, and potential voiding complications in the future.  At this time he would like to return in 3 months for uroflow/PVR/AUA symptom score to see if there is any objective change in his voiding pattern of voiding habits consistent with stricture.  If no concerns on uroflow, will set routine follow-up, and if there is a strictured pattern or intermittency and he does not perceive consistent with stricture, could consider planning DVIU of which this procedure was described today.    However in the interim, next available, will get his CT urogram to complete gross hematuria upper tract evaluation as well as evaluation for any active stones.  We did review basic stone prevention recommendations prior to discharge    Appointments for CT scan with lab on arrival, and three-month nurse visit were provided today prior to patient discharge home     Discharge home today status post uncomplicated procedure as above  Diet - resume home diet  Follow up: CTU within 2 weeks, 3 mos uroflow. Consider formal dviu  Instructions:  Drink plenty of water, may see blood in urine, complete prophylactic antibiotics.    Meds:      Medication List        START taking these medications      ciprofloxacin HCl 500 MG tablet  Commonly known as: CIPRO  Take 1 tablet (500 mg total) by mouth 2 (two) times a day.            CONTINUE taking these medications      allopurinoL 100 MG tablet  Commonly  known as: ZYLOPRIM     ALPRAZolam 1 MG tablet  Commonly known as: XANAX     aspirin 81 MG EC tablet  Commonly known as: ECOTRIN  Take 1 tablet (81 mg total) by mouth 2 (two) times a day.     EScitalopram oxalate 20 MG tablet  Commonly known as: LEXAPRO     eszopiclone 3 mg Tab  Commonly known as: LUNESTA     FARXIGA 10 mg tablet  Generic drug: dapagliflozin propanediol     FREESTYLE CORWIN 3 SENSOR Sophy  Generic drug: blood-glucose sensor     ibuprofen 600 MG tablet  Commonly known as: ADVIL,MOTRIN  Take 1 tablet (600 mg total) by mouth 3 (three) times daily as needed for Pain.     lisinopriL 10 MG tablet     NEXLIZET 180-10 mg Tab  Generic drug: bempedoic acid-ezetimibe     ofloxacin 0.3 % ophthalmic solution  Commonly known as: OCUFLOX  Place 1 drop into the right eye 4 (four) times daily.     ondansetron 4 MG tablet  Commonly known as: ZOFRAN  Take 1 tablet (4 mg total) by mouth every 6 (six) hours as needed for Nausea.     oxyCODONE-acetaminophen 5-325 mg per tablet  Commonly known as: PERCOCET  Take 1 tablet by mouth every 6 (six) hours as needed for Pain.     OZEMPIC 0.25 mg or 0.5 mg (2 mg/3 mL) pen injector  Generic drug: semaglutide     PAIN RELIEVER ES(ACETAMINOPHN) 500 MG tablet  Generic drug: acetaminophen  Take 2 tablets (1,000 mg total) by mouth every 8 (eight) hours as needed for Pain.               Where to Get Your Medications        These medications were sent to [facility] - [facility] [address]  [address] [address]      Phone: [phone]   ciprofloxacin HCl 500 MG tablet

## 2023-11-07 ENCOUNTER — HOSPITAL ENCOUNTER (OUTPATIENT)
Dept: RADIOLOGY | Facility: HOSPITAL | Age: 63
Discharge: HOME | End: 2023-11-07
Attending: UROLOGY
Payer: COMMERCIAL

## 2023-11-07 DIAGNOSIS — R31.0 GROSS HEMATURIA: ICD-10-CM

## 2023-11-07 PROCEDURE — 74178 CT ABD&PLV WO CNTR FLWD CNTR: CPT | Mod: TC

## 2023-11-07 PROCEDURE — 74178 CT ABD&PLV WO CNTR FLWD CNTR: CPT | Mod: 26,,, | Performed by: RADIOLOGY

## 2023-11-07 PROCEDURE — 74178 CT UROGRAM ABD PELVIS W WO: ICD-10-PCS | Mod: 26,,, | Performed by: RADIOLOGY

## 2023-11-07 PROCEDURE — 25500020 PHARM REV CODE 255

## 2023-11-07 RX ADMIN — IOHEXOL 125 ML: 350 INJECTION, SOLUTION INTRAVENOUS at 04:11

## 2023-11-10 NOTE — PROGRESS NOTES
No stones though multiple cysts both kidneys consistent with PKD polycystic kidney disease  Keep nurse visit uroflow planned as scheduled next year for reeval of urethral stricture to determine if needs management

## 2024-11-20 ENCOUNTER — OFFICE VISIT (OUTPATIENT)
Dept: PRIMARY CARE CLINIC | Facility: CLINIC | Age: 64
End: 2024-11-20
Payer: COMMERCIAL

## 2024-11-20 VITALS
HEART RATE: 66 BPM | BODY MASS INDEX: 33.86 KG/M2 | RESPIRATION RATE: 18 BRPM | OXYGEN SATURATION: 93 % | HEIGHT: 69 IN | WEIGHT: 228.63 LBS | DIASTOLIC BLOOD PRESSURE: 78 MMHG | SYSTOLIC BLOOD PRESSURE: 126 MMHG

## 2024-11-20 DIAGNOSIS — Z76.89 ENCOUNTER TO ESTABLISH CARE: Primary | ICD-10-CM

## 2024-11-20 DIAGNOSIS — Q61.3 PKD (POLYCYSTIC KIDNEY DISEASE): ICD-10-CM

## 2024-11-20 DIAGNOSIS — M54.50 CHRONIC LEFT-SIDED LOW BACK PAIN WITHOUT SCIATICA: ICD-10-CM

## 2024-11-20 DIAGNOSIS — M25.512 CHRONIC LEFT SHOULDER PAIN: ICD-10-CM

## 2024-11-20 DIAGNOSIS — N18.31 CKD STAGE 3A, GFR 45-59 ML/MIN: ICD-10-CM

## 2024-11-20 DIAGNOSIS — K29.60 REFLUX GASTRITIS: ICD-10-CM

## 2024-11-20 DIAGNOSIS — G89.29 CHRONIC LEFT-SIDED LOW BACK PAIN WITHOUT SCIATICA: ICD-10-CM

## 2024-11-20 DIAGNOSIS — G89.29 CHRONIC LEFT SHOULDER PAIN: ICD-10-CM

## 2024-11-20 PROCEDURE — 99999 PR PBB SHADOW E&M-EST. PATIENT-LVL V: CPT | Mod: PBBFAC,,, | Performed by: STUDENT IN AN ORGANIZED HEALTH CARE EDUCATION/TRAINING PROGRAM

## 2024-11-20 PROCEDURE — 3074F SYST BP LT 130 MM HG: CPT | Mod: CPTII,S$GLB,, | Performed by: STUDENT IN AN ORGANIZED HEALTH CARE EDUCATION/TRAINING PROGRAM

## 2024-11-20 PROCEDURE — 1160F RVW MEDS BY RX/DR IN RCRD: CPT | Mod: CPTII,S$GLB,, | Performed by: STUDENT IN AN ORGANIZED HEALTH CARE EDUCATION/TRAINING PROGRAM

## 2024-11-20 PROCEDURE — 3008F BODY MASS INDEX DOCD: CPT | Mod: CPTII,S$GLB,, | Performed by: STUDENT IN AN ORGANIZED HEALTH CARE EDUCATION/TRAINING PROGRAM

## 2024-11-20 PROCEDURE — 99214 OFFICE O/P EST MOD 30 MIN: CPT | Mod: S$GLB,,, | Performed by: STUDENT IN AN ORGANIZED HEALTH CARE EDUCATION/TRAINING PROGRAM

## 2024-11-20 PROCEDURE — 3078F DIAST BP <80 MM HG: CPT | Mod: CPTII,S$GLB,, | Performed by: STUDENT IN AN ORGANIZED HEALTH CARE EDUCATION/TRAINING PROGRAM

## 2024-11-20 PROCEDURE — 1159F MED LIST DOCD IN RCRD: CPT | Mod: CPTII,S$GLB,, | Performed by: STUDENT IN AN ORGANIZED HEALTH CARE EDUCATION/TRAINING PROGRAM

## 2024-11-20 PROCEDURE — 4010F ACE/ARB THERAPY RXD/TAKEN: CPT | Mod: CPTII,S$GLB,, | Performed by: STUDENT IN AN ORGANIZED HEALTH CARE EDUCATION/TRAINING PROGRAM

## 2024-11-20 RX ORDER — FAMOTIDINE 20 MG/1
20 TABLET, FILM COATED ORAL NIGHTLY PRN
Qty: 30 TABLET | Refills: 5 | Status: SHIPPED | OUTPATIENT
Start: 2024-11-20 | End: 2025-11-20

## 2024-11-20 RX ORDER — METHOCARBAMOL 750 MG/1
750 TABLET, FILM COATED ORAL EVERY 4 HOURS PRN
Qty: 60 TABLET | Refills: 0 | Status: SHIPPED | OUTPATIENT
Start: 2024-11-20 | End: 2024-11-30

## 2024-11-20 RX ORDER — DICLOFENAC SODIUM 50 MG/1
50 TABLET, DELAYED RELEASE ORAL 3 TIMES DAILY PRN
Qty: 60 TABLET | Refills: 2 | Status: SHIPPED | OUTPATIENT
Start: 2024-11-21 | End: 2024-11-20

## 2024-11-20 NOTE — PROGRESS NOTES
Subjective:       Patient ID: Brian Tong is a 64 y.o. male.    Chief Complaint: Back Pain (Lower back pain) and Shoulder Pain (Left shoulder)    HPI:  64 y.o. male presents to [facility] to establish care    Acute concerns?: with complaints of low back pain and shoulder pain    Patient reports left shoulder pain that began about 6 months.    Onset?: 6 months  Progression?: Always present, Constant  Inciting incident/new physical activity?: Possible lifting  Past trauma/surgery?: No  Recurrent?: No  Description?: Stretching  Radiates?: No  Severity?: 10/10 at worst  Worsening factors?: Laying on  Interventions?: Ibuprofen, warm bath  Did interventions help?: Some relief  History of bariatric surgery, MI, renal disease, or GI bleed/ulcer?: No  On Blood thinners?: No    Low left back pain began months ago    Onset?: Months back  Progression?: Yes  Inciting incident/new physical activity?: No known  Past trauma/surgery?: No  Recurrent?: No  Description?: Sharp  Radiates?: No  Severity?: 10/10 at worst  Worsening factors?: Stopping at night for rest  Interventions?: Staying active, ibuprofen, heating pad  Did interventions help?: Yes, partial, transient  Falls?: No  Incontinence?: No    Last PCP?:  Allergies: NKDA  Medical History: Anxiety, diabetes, hypertension, renal stones, CKD stage 3a  Medications: allopurinol 100 mg, alprazolam 1 mg bid, lexapro 20, Lunesta 3 mg, Farxiga 10 mg, Freestyle angelique, lisinopril 10 mg, Nexlizet 180-10 mg. Ozempic 0.5 mg qWeek  Surgical History: arthroplasty right knee  Family History: Brother laryngeal cancer, mother pancreatic cancer; no known autoimmune, no dementia  Social History: Never smoker, EtOH on occasion twice monthly, no illicits    Dr. Jose Armando Ramirez (Cardiology) prescribing Xanax and Lunesta.  Currently taking medications     Hep C/HIV screening?: Feels had in past, will defer  Flu vaccine?: Declines    Review of Systems   Constitutional:  Positive for fatigue  "(Sometimes during the day). Negative for chills, diaphoresis and fever.   HENT:  Negative for congestion, sinus pressure, sneezing and sore throat.    Respiratory:  Negative for cough and shortness of breath.    Cardiovascular:  Negative for chest pain and palpitations.   Gastrointestinal:  Negative for abdominal pain, diarrhea, nausea and vomiting.        Reflux at times   Musculoskeletal:  Positive for arthralgias (Left shoulder pain) and back pain (Low left back). Negative for joint swelling and myalgias.   Skin:  Negative for rash and wound.   Neurological:  Positive for headaches (Lately has been daily. Posterior neck into base of skull). Negative for dizziness and light-headedness.       Objective:      Vitals:    11/20/24 1417   BP: 126/78   BP Location: Right arm   Patient Position: Sitting   Pulse: 66   Resp: 18   SpO2: (!) 93%   Weight: 103.7 kg (228 lb 9.9 oz)   Height: 5' 9" (1.753 m)     Physical Exam  Vitals reviewed.   Constitutional:       General: He is not in acute distress.     Appearance: Normal appearance. He is not ill-appearing.   HENT:      Head: Normocephalic and atraumatic.   Eyes:      General:         Right eye: No discharge.         Left eye: No discharge.      Conjunctiva/sclera: Conjunctivae normal.   Cardiovascular:      Rate and Rhythm: Normal rate and regular rhythm.      Pulses: Normal pulses.      Heart sounds: No murmur heard.  Pulmonary:      Effort: Pulmonary effort is normal.      Breath sounds: Normal breath sounds.   Musculoskeletal:         General: No deformity.      Right shoulder: Normal. No swelling, deformity, effusion, laceration, tenderness, bony tenderness or crepitus. Normal range of motion. Normal strength.      Left shoulder: No swelling, deformity, effusion, laceration, tenderness, bony tenderness or crepitus. Normal range of motion. Normal strength.      Lumbar back: No swelling, edema, deformity, signs of trauma, lacerations, spasms, tenderness or bony " "tenderness. Normal range of motion (Sharp pain with left lateral flexion). No scoliosis.      Comments: Positive empty can and French horn left shoulder. Negative speed, apley, posterior lift-off, AC tenderness, or bicipital tendon tenderness bilaterally   Skin:     General: Skin is warm and dry.      Coloration: Skin is not jaundiced.   Neurological:      General: No focal deficit present.      Mental Status: He is alert and oriented to person, place, and time.   Psychiatric:         Mood and Affect: Mood normal.         Behavior: Behavior normal.             Lab Results   Component Value Date     09/26/2023    K 4.0 09/26/2023     09/26/2023    CO2 22 (L) 09/26/2023    BUN 21 09/26/2023    CREATININE 1.6 (H) 11/07/2023    ANIONGAP 12 09/26/2023     No results found for: "HGBA1C"  No results found for: "BNP", "BNPTRIAGEBLO"    Lab Results   Component Value Date    WBC 6.13 06/15/2023    HGB 14.5 06/15/2023    HCT 42.7 06/15/2023     06/15/2023    GRAN 3.2 06/15/2023    GRAN 51.6 06/15/2023     No results found for: "CHOL", "HDL", "LDLCALC", "TRIG"       Current Outpatient Medications:     allopurinoL (ZYLOPRIM) 100 MG tablet, Take 100 mg by mouth., Disp: , Rfl:     ALPRAZolam (XANAX) 1 MG tablet, TAKE ONE TABLET BY MOUTH TWICE DAILY IF NEEDED FOR ANXIETY FOR 30 DAYS, Disp: , Rfl:     EScitalopram oxalate (LEXAPRO) 20 MG tablet, Take 20 mg by mouth., Disp: , Rfl:     eszopiclone (LUNESTA) 3 mg Tab, TAKE 1 TABLET BY MOUTH DAILY, IMMEDIATELY BEFORE BEDTIME, Disp: , Rfl:     FARXIGA 10 mg tablet, Take 10 mg by mouth., Disp: , Rfl:     lisinopriL 10 MG tablet, Take 10 mg by mouth., Disp: , Rfl:     NEXLIZET 180-10 mg Tab, Take 1 tablet by mouth., Disp: , Rfl:     OZEMPIC 0.25 mg or 0.5 mg (2 mg/3 mL) pen injector, , Disp: , Rfl:     famotidine (PEPCID) 20 MG tablet, Take 1 tablet (20 mg total) by mouth nightly as needed for Heartburn., Disp: 30 tablet, Rfl: 5    FREESTYLE CORWIN 3 SENSOR Sophy, change " every 2 WEEKS (Patient not taking: Reported on 11/20/2024), Disp: , Rfl:     methocarbamoL (ROBAXIN) 750 MG Tab, Take 1 tablet (750 mg total) by mouth every 4 (four) hours as needed (Back pain)., Disp: 60 tablet, Rfl: 0        Assessment:       1. Encounter to establish care    2. Chronic left shoulder pain    3. Chronic left-sided low back pain without sciatica    4. PKD (polycystic kidney disease)    5. CKD stage 3a, GFR 45-59 ml/min    6. Reflux gastritis           Plan:       Encounter to establish care    Chronic left shoulder pain  Chronic left-sided low back pain without sciatica  -     Discontinue: diclofenac (VOLTAREN) 50 MG EC tablet; Take 1 tablet (50 mg total) by mouth 3 (three) times daily as needed (Pain). Do not combine with naproxen or ibuprofen  Dispense: 60 tablet; Refill: 2  -     methocarbamoL (ROBAXIN) 750 MG Tab; Take 1 tablet (750 mg total) by mouth every 4 (four) hours as needed (Back pain).  Dispense: 60 tablet; Refill: 0  -     Ambulatory referral/consult to Physical/Occupational Therapy; Future; Expected date: 11/27/2024  -     X-Ray Lumbar Spine 5 View; Future; Expected date: 11/20/2024  -     X-Ray Shoulder 2 or More Views Left; Future; Expected date: 11/20/2024  - RICE therapy explained and recommended. If not improving in 4-6 weeks, can consider MRI/CT and/or specialist referral  - Instructed to avoid NSAIDs    PKD (polycystic kidney disease)  CKD stage 3a, GFR 45-59 ml/min  -     Ambulatory referral/consult to Nephrology; Future; Expected date: 11/27/2024  - Avoid nephrotoxic agents    Reflux gastritis  -     famotidine (PEPCID) 20 MG tablet; Take 1 tablet (20 mg total) by mouth nightly as needed for Heartburn.  Dispense: 30 tablet; Refill: 5    RTC PRN

## 2024-11-27 DIAGNOSIS — D63.1 ANEMIA IN CHRONIC KIDNEY DISEASE, UNSPECIFIED CKD STAGE: Primary | ICD-10-CM

## 2024-11-27 DIAGNOSIS — N18.9 ANEMIA IN CHRONIC KIDNEY DISEASE, UNSPECIFIED CKD STAGE: Primary | ICD-10-CM

## 2024-12-02 ENCOUNTER — OFFICE VISIT (OUTPATIENT)
Dept: NEPHROLOGY | Facility: CLINIC | Age: 64
End: 2024-12-02
Payer: COMMERCIAL

## 2024-12-02 VITALS
BODY MASS INDEX: 33.73 KG/M2 | OXYGEN SATURATION: 98 % | SYSTOLIC BLOOD PRESSURE: 133 MMHG | DIASTOLIC BLOOD PRESSURE: 81 MMHG | HEART RATE: 61 BPM | WEIGHT: 228.38 LBS

## 2024-12-02 DIAGNOSIS — N18.30 BENIGN HYPERTENSION WITH CKD (CHRONIC KIDNEY DISEASE) STAGE III: ICD-10-CM

## 2024-12-02 DIAGNOSIS — I12.9 BENIGN HYPERTENSION WITH CKD (CHRONIC KIDNEY DISEASE) STAGE III: ICD-10-CM

## 2024-12-02 DIAGNOSIS — N18.31 CKD STAGE 3A, GFR 45-59 ML/MIN: ICD-10-CM

## 2024-12-02 DIAGNOSIS — Q61.3 PKD (POLYCYSTIC KIDNEY DISEASE): Primary | ICD-10-CM

## 2024-12-02 DIAGNOSIS — N28.1 CYST OF KIDNEY, ACQUIRED: ICD-10-CM

## 2024-12-02 PROCEDURE — 99999 PR PBB SHADOW E&M-EST. PATIENT-LVL IV: CPT | Mod: PBBFAC,,, | Performed by: INTERNAL MEDICINE

## 2024-12-02 RX ORDER — ASPIRIN 325 MG
50000 TABLET, DELAYED RELEASE (ENTERIC COATED) ORAL
Qty: 16 CAPSULE | Refills: 0 | Status: SHIPPED | OUTPATIENT
Start: 2024-12-02 | End: 2025-03-18

## 2024-12-02 NOTE — PROGRESS NOTES
Nephrology Clinic Note   12/2/2024    Chief Complaint   Patient presents with    Chronic Kidney Disease      History of present illness:  Patient is a 64 y.o. male. Referred for cystic kidney disease and ckd stage 3. Patient knew that he has kidney cysts around 10 years ago. Denies any family history of kidney diease. Was using NSAIDS occasionally up until a month ago but stopped. Cr has been ranging between 1.4-1.7 since June/2023. Have no labs prior to 2023       Review of Systems   Constitutional: Negative.    HENT: Negative.     Eyes: Negative.    Respiratory: Negative.     Cardiovascular: Negative.    Gastrointestinal: Negative.    Genitourinary: Negative.    Musculoskeletal: Negative.    Skin: Negative.    Neurological: Negative.    Endo/Heme/Allergies: Negative.    Psychiatric/Behavioral: Negative.     All other systems reviewed and are negative.      History:  Past Medical History:   Diagnosis Date    Anxiety disorder, unspecified     CKD stage 3a, GFR 45-59 ml/min     Diabetes mellitus     Hypertension     Kidney stones     PCK (polycystic kidney disease)       Past Surgical History:   Procedure Laterality Date    CYSTOSCOPY N/A 10/31/2023    Procedure: CYSTOSCOPY;  Surgeon: Lázaro Marquez MD;  Location: [facility] OR;  Service: Urology;  Laterality: N/A;  PT REQUEST 1 PM TIME    right knee surgery      ROBOTIC ARTHROPLASTY, KNEE Right 6/27/2023    Procedure: ROBOTIC ARTHROPLASTY, KNEE, TOTAL;  Surgeon: Jonathan Rayo MD;  Location: [facility] OR;  Service: Orthopedics;  Laterality: Right;        Current Outpatient Medications:     allopurinoL (ZYLOPRIM) 100 MG tablet, Take 100 mg by mouth., Disp: , Rfl:     ALPRAZolam (XANAX) 1 MG tablet, TAKE ONE TABLET BY MOUTH TWICE DAILY IF NEEDED FOR ANXIETY FOR 30 DAYS, Disp: , Rfl:     EScitalopram oxalate (LEXAPRO) 20 MG tablet, Take 20 mg by mouth., Disp: , Rfl:     eszopiclone (LUNESTA) 3 mg Tab, TAKE 1 TABLET BY MOUTH DAILY, IMMEDIATELY BEFORE BEDTIME,  Disp: , Rfl:     famotidine (PEPCID) 20 MG tablet, Take 1 tablet (20 mg total) by mouth nightly as needed for Heartburn., Disp: 30 tablet, Rfl: 5    FARXIGA 10 mg tablet, Take 10 mg by mouth., Disp: , Rfl:     lisinopriL 10 MG tablet, Take 10 mg by mouth., Disp: , Rfl:     NEXLIZET 180-10 mg Tab, Take 1 tablet by mouth., Disp: , Rfl:     OZEMPIC 0.25 mg or 0.5 mg (2 mg/3 mL) pen injector, , Disp: , Rfl:     FREESTYLE CORWIN 3 SENSOR Sophy, change every 2 WEEKS (Patient not taking: Reported on 12/2/2024), Disp: , Rfl:   Review of patient's allergies indicates:   Allergen Reactions    Metformin       Social History     Tobacco Use    Smoking status: Never    Smokeless tobacco: Never   Substance Use Topics    Alcohol use: Yes     Alcohol/week: 2.0 standard drinks of alcohol     Types: 2 Cans of beer per week     Comment: occasionally      No family history on file.     Physical Exam :  Vitals:    12/02/24 0854   BP: 133/81   Pulse: 61     Physical Exam  Constitutional:       Appearance: Normal appearance.   HENT:      Head: Normocephalic and atraumatic.      Mouth/Throat:      Mouth: Mucous membranes are moist.   Eyes:      Conjunctiva/sclera: Conjunctivae normal.      Pupils: Pupils are equal, round, and reactive to light.   Cardiovascular:      Rate and Rhythm: Normal rate and regular rhythm.      Pulses: Normal pulses.      Heart sounds: Normal heart sounds.   Pulmonary:      Effort: Pulmonary effort is normal.      Breath sounds: Normal breath sounds.   Abdominal:      General: Bowel sounds are normal.      Palpations: Abdomen is soft.   Musculoskeletal:         General: Normal range of motion.   Skin:     General: Skin is warm.      Capillary Refill: Capillary refill takes less than 2 seconds.   Neurological:      General: No focal deficit present.      Mental Status: He is alert and oriented to person, place, and time.   Psychiatric:         Mood and Affect: Mood normal.         Labs reviewed   Images  Reviewed    Assessment:    1. PKD (polycystic kidney disease)    2. CKD stage 3a, GFR 45-59 ml/min scr baseline 1.4-1.7/without proteinuria/without hematuria     3       DM with CKD stage 3  4       HTN with CKD stage 3     Plan:    Will get genetic testing. Patient denies family history of kidney disease   Will get MRI of kidneys to get an idea ofkidney volume   BP controlled at home 120/70. C/w lisinopril 10 mg qday  C/w farxiga   Decrease salt intake to less than 2 grams per day   Advise increase water intake to achieve 2L of urien per day   Will determine next step pending genetic testing and MRI   RTC in 3 months. Will call patient once above results are back to discuss plan   Avoid NSAIDS advised

## 2024-12-07 ENCOUNTER — E-VISIT (OUTPATIENT)
Dept: PRIMARY CARE CLINIC | Facility: CLINIC | Age: 64
End: 2024-12-07
Payer: COMMERCIAL

## 2024-12-07 DIAGNOSIS — G89.29 CHRONIC LOW BACK PAIN, UNSPECIFIED BACK PAIN LATERALITY, UNSPECIFIED WHETHER SCIATICA PRESENT: Primary | ICD-10-CM

## 2024-12-07 DIAGNOSIS — M54.50 CHRONIC LOW BACK PAIN, UNSPECIFIED BACK PAIN LATERALITY, UNSPECIFIED WHETHER SCIATICA PRESENT: Primary | ICD-10-CM

## 2024-12-10 PROBLEM — E11.9 TYPE 2 DIABETES MELLITUS WITHOUT COMPLICATIONS: Status: ACTIVE | Noted: 2023-03-22

## 2024-12-10 PROBLEM — M54.50 CHRONIC LOW BACK PAIN: Status: ACTIVE | Noted: 2024-12-10

## 2024-12-10 PROBLEM — G89.29 CHRONIC LOW BACK PAIN: Status: ACTIVE | Noted: 2024-12-10

## 2024-12-10 PROBLEM — I10 ESSENTIAL (PRIMARY) HYPERTENSION: Status: ACTIVE | Noted: 2023-03-22

## 2024-12-10 PROBLEM — N18.31 STAGE 3A CHRONIC KIDNEY DISEASE: Status: ACTIVE | Noted: 2024-12-10

## 2024-12-10 NOTE — PROGRESS NOTES
@Patient ID: Brian Tong is a 64 y.o. male.    Chief Complaint: General Illness (Entered automatically based on patient selection in [employer].)    The patient initiated a request through [employer] on 12/7/2024 for evaluation and management with a chief complaint of General Illness (Entered automatically based on patient selection in [employer].)     I evaluated the questionnaire submission on 12/10/24.    [facility] Evisit [facility]-Muscle,Back,Joint    12/7/2024 10:01 PM CST - Filed by Patient   What do you need help with? Other Concern   Do you agree to participate in an E-Visit? Yes   If you have any of the following symptoms, please present to your local emergency room or call 911:  I acknowledge   What is the main issue you would like addressed today? I would like to schedule therapy at [facility].   Please describe your symptoms Back   Where is your problem located? Back   How severe are your symptoms? Moderate   Have you had these symptoms before? Yes   How long have you been having these symptoms? For one to four weeks   Please list any medications or treatments you have used for your condition and indicate if it was effective or not. What ever you prescribed.   What makes this feel better? Nothing   What makes this feel worse? Everything   Are these symptoms related to a condition that you currently have? Yes   What is the condition? Back pain   When were you last seen for this condition?    Please describe any probable cause for these symptoms Medicine is not helping.   Provide any additional information you feel is important. Na   Please attach any relevant images or files    Are you able to take your vital signs? No         Encounter Diagnosis   Name Primary?    Chronic low back pain, unspecified back pain laterality, unspecified whether sciatica present Yes        Orders Placed This Encounter   Procedures    Ambulatory referral/consult to Physical/Occupational Therapy     Standing Status:   Future      Standing Expiration Date:   1/10/2026     Referral Priority:   Routine     Referral Type:   Physical Medicine     Referral Reason:   Specialty Services Required     Requested Specialty:   Physical Therapy     Number of Visits Requested:   1            No follow-ups on file.      E-Visit Time Tracking:    Day 4 Time (in minutes): 7    Total Time (in minutes): 7

## 2024-12-23 ENCOUNTER — TELEPHONE (OUTPATIENT)
Dept: NEPHROLOGY | Facility: CLINIC | Age: 64
End: 2024-12-23
Payer: COMMERCIAL

## 2025-01-23 DIAGNOSIS — G89.29 CHRONIC LEFT SHOULDER PAIN: ICD-10-CM

## 2025-01-23 DIAGNOSIS — G89.29 CHRONIC LEFT-SIDED LOW BACK PAIN WITHOUT SCIATICA: ICD-10-CM

## 2025-01-23 DIAGNOSIS — M54.50 CHRONIC LEFT-SIDED LOW BACK PAIN WITHOUT SCIATICA: ICD-10-CM

## 2025-01-23 DIAGNOSIS — M25.512 CHRONIC LEFT SHOULDER PAIN: ICD-10-CM

## 2025-01-23 NOTE — TELEPHONE ENCOUNTER
No care due was identified.  [facility] Embedded Care Due Messages. Reference number: [id number].   1/23/2025 11:00:56 AM CST   Subjective:     Postpartum Day 1:  Delivery    Pain is well controlled on oral medication. Bleeding is mild. Patient is providing Breast milk to her infant.  She is ambulating well,  tolerating a general diet, and her beach catheter was removed this morning. Flatus has not been passed.    Objective:      Patient Vitals for the past 8 hrs:   BP Temp Temp src Pulse Resp SpO2   20 0531 108/70 98.1 °F (36.7 °C) Oral 79 16 98 %   20 0136 106/64 97.7 °F (36.5 °C) Oral 76 16 96 %     General:    alert, appears stated age and cooperative   Lochia:  appropriate   Uterine Fundus:   firm   Incision:  healing well, no significant drainage, no dehiscence, no significant erythema   Extremities: Non-tender, no edema     I/O last 3 completed shifts:  In: 250 [I.V.:250]  Out: 2640 [Urine:2150; Blood:490]    HCT (%)   Date Value   2020 31.8 (L)   11/15/2018 31.9 (L)       Surgical Care Improvement Project:    Beach in Place: No    DVT/VTE Prophylaxis:  VTE Pharmacologic Prophylaxis: No pharmacologic Venous Thromboembolism prophylaxis due to Patient fully ambulating and deemed to be low risk  VTE Mechanical Prophylaxis: Yes    Antibiotics D/C'd within 24 hours of surgery : Yes  Central Line: N/A  Beta Blocker: N/A    Assessment:     Status post  section. Doing well postoperatively.     Plan:     Continue current care.

## 2025-01-24 RX ORDER — DICLOFENAC SODIUM 50 MG/1
50 TABLET, DELAYED RELEASE ORAL 3 TIMES DAILY PRN
Qty: 60 TABLET | Refills: 0 | Status: SHIPPED | OUTPATIENT
Start: 2025-01-24

## 2025-01-29 ENCOUNTER — TELEPHONE (OUTPATIENT)
Dept: PHARMACY | Facility: CLINIC | Age: 65
End: 2025-01-29
Payer: COMMERCIAL

## 2025-01-29 NOTE — TELEPHONE ENCOUNTER
[facility]/Population Health Chart Review & Patient Outreach Details For Medication Adherence Project    Reason for Outreach Encounter: 3rd Party payor non-compliance report ([employer], [employer], [employer], etc)  2.  Patient Outreach Method: Reviewed patient chart   3.   Medication in question:    Hypertension Medications               lisinopriL 10 MG tablet Take 10 mg by mouth.                   last filled  1/17/25 for 30 day supply      4.  Reviewed and or Updates Made To: Patient Chart  5. Outreach Outcomes and/or actions taken: Patient filled medication and is on track to be adherent  Additional Notes:

## 2025-02-19 ENCOUNTER — HOSPITAL ENCOUNTER (EMERGENCY)
Facility: HOSPITAL | Age: 65
Discharge: HOME | End: 2025-02-19
Attending: EMERGENCY MEDICINE
Payer: COMMERCIAL

## 2025-02-19 VITALS
WEIGHT: 215 LBS | OXYGEN SATURATION: 100 % | SYSTOLIC BLOOD PRESSURE: 98 MMHG | DIASTOLIC BLOOD PRESSURE: 64 MMHG | BODY MASS INDEX: 31.84 KG/M2 | HEIGHT: 69 IN | TEMPERATURE: 98 F | HEART RATE: 62 BPM | RESPIRATION RATE: 15 BRPM

## 2025-02-19 DIAGNOSIS — Q61.3 PKD (POLYCYSTIC KIDNEY DISEASE): Primary | ICD-10-CM

## 2025-02-19 DIAGNOSIS — S20.221A BACK CONTUSION, RIGHT, INITIAL ENCOUNTER: Primary | ICD-10-CM

## 2025-02-19 LAB
BACTERIA #/AREA URNS HPF: NORMAL /HPF
BILIRUB UR QL STRIP: NEGATIVE
CLARITY UR: CLEAR
COLOR UR: YELLOW
GLUCOSE UR QL STRIP: ABNORMAL
HGB UR QL STRIP: NEGATIVE
KETONES UR QL STRIP: NEGATIVE
LEUKOCYTE ESTERASE UR QL STRIP: NEGATIVE
MICROSCOPIC COMMENT: NORMAL
NITRITE UR QL STRIP: NEGATIVE
PH UR STRIP: 5 [PH] (ref 5–8)
PROT UR QL STRIP: NEGATIVE
RBC #/AREA URNS HPF: 0 /HPF (ref 0–4)
SP GR UR STRIP: 1.02 (ref 1–1.03)
SQUAMOUS #/AREA URNS HPF: 0 /HPF
URN SPEC COLLECT METH UR: ABNORMAL
UROBILINOGEN UR STRIP-ACNC: NEGATIVE EU/DL
WBC #/AREA URNS HPF: 1 /HPF (ref 0–5)
YEAST URNS QL MICRO: NORMAL

## 2025-02-19 PROCEDURE — 25000003 PHARM REV CODE 250: Performed by: EMERGENCY MEDICINE

## 2025-02-19 PROCEDURE — 81000 URINALYSIS NONAUTO W/SCOPE: CPT | Performed by: EMERGENCY MEDICINE

## 2025-02-19 PROCEDURE — 99284 EMERGENCY DEPT VISIT MOD MDM: CPT | Mod: 25

## 2025-02-19 RX ORDER — HYDROCODONE BITARTRATE AND ACETAMINOPHEN 5; 325 MG/1; MG/1
1 TABLET ORAL
Refills: 0 | Status: COMPLETED | OUTPATIENT
Start: 2025-02-19 | End: 2025-02-19

## 2025-02-19 RX ORDER — HYDROCODONE BITARTRATE AND ACETAMINOPHEN 5; 325 MG/1; MG/1
1 TABLET ORAL EVERY 6 HOURS PRN
Qty: 10 TABLET | Refills: 0 | Status: SHIPPED | OUTPATIENT
Start: 2025-02-19

## 2025-02-19 RX ADMIN — HYDROCODONE BITARTRATE AND ACETAMINOPHEN 1 TABLET: 5; 325 TABLET ORAL at 01:02

## 2025-02-19 NOTE — ED PROVIDER NOTES
Encounter Date: 2/19/2025       History     Chief Complaint   Patient presents with    Back Pain     Pt states he fell off ladder 3 wks ago. No x rays done at time. Pt states the pain is worse. Pain to right side/ right above hip area.      64-year-old male history of polycystic kidney disease, kidney stones anxiety presents with right-sided low back pain after fall 3 weeks ago.  Fell off of a ladder.  He was in a trailer and fell onto the ramp.  Struck his neck and low back.  Has persistent low back pain that seems to be worsening.  Worsens with flexion and extension.  No abdominal pain.  Some radiation to the back of the hip.  No radiation to the leg.  No hematuria.  Does not feel like prior kidney stones according to the patient.    The history is provided by the patient.     Review of patient's allergies indicates:   Allergen Reactions    Metformin      Past Medical History:   Diagnosis Date    Anxiety disorder, unspecified     CKD stage 3a, GFR 45-59 ml/min     Diabetes mellitus     Hypertension     Kidney stones     PCK (polycystic kidney disease)      Past Surgical History:   Procedure Laterality Date    CYSTOSCOPY N/A 10/31/2023    Procedure: CYSTOSCOPY;  Surgeon: Lázaro Marquez MD;  Location: [facility] OR;  Service: Urology;  Laterality: N/A;  PT REQUEST 1 PM TIME    right knee surgery      ROBOTIC ARTHROPLASTY, KNEE Right 6/27/2023    Procedure: ROBOTIC ARTHROPLASTY, KNEE, TOTAL;  Surgeon: Jonathan Rayo MD;  Location: [facility] OR;  Service: Orthopedics;  Laterality: Right;     No family history on file.  Social History[1]  Review of Systems   Musculoskeletal:  Positive for back pain.       Physical Exam     Initial Vitals   BP Pulse Resp Temp SpO2   02/19/25 1146 02/19/25 1146 02/19/25 1146 02/19/25 1146 02/19/25 1149   105/64 63 18 97.8 °F (36.6 °C) 95 %      MAP       --                Physical Exam    Nursing note and vitals reviewed.  Constitutional: He appears well-developed and  well-nourished. He is not diaphoretic.  Non-toxic appearance. He does not have a sickly appearance. He does not appear ill. No distress.   HENT:   Head: Atraumatic. Head is without raccoon's eyes and without Calix's sign.   Right Ear: External ear normal.   Left Ear: External ear normal.   Eyes: Pupils are equal, round, and reactive to light.   Neck: Neck supple.   Normal range of motion.   Full passive range of motion without pain.     Cardiovascular:  Normal rate, regular rhythm and normal heart sounds.           No murmur heard.  Pulmonary/Chest: Breath sounds normal. No respiratory distress. He has no wheezes. He has no rhonchi. He has no rales.   Abdominal: Abdomen is soft. He exhibits no distension. There is no abdominal tenderness. There is no rebound and no guarding.   Musculoskeletal:         General: No edema.      Right shoulder: Normal.      Left shoulder: Normal.      Right elbow: Normal.      Left elbow: Normal.      Right wrist: Normal.      Left wrist: Normal.      Cervical back: Full passive range of motion without pain, normal range of motion and neck supple. No rigidity. No spinous process tenderness or muscular tenderness. Normal range of motion.      Lumbar back: Tenderness and bony tenderness (rib) present. Decreased range of motion.        Back:       Right hip: Normal.      Left hip: Normal.      Right knee: Normal.      Left knee: Normal.      Right ankle: Normal.      Left ankle: Normal.      Comments: Right lumbar paraspinal tenderness.  Right lower posterior rib tenderness.  No spinous process tenderness     Neurological: He is alert and oriented to person, place, and time.   Psychiatric: He has a normal mood and affect. His behavior is normal. Judgment and thought content normal.         ED Course   Procedures  Labs Reviewed   URINALYSIS, REFLEX TO URINE CULTURE - Abnormal       Result Value    Specimen UA Urine, Clean Catch      Color, UA Yellow      Appearance, UA Clear      pH, UA  5.0      Specific Gravity, UA 1.020      Protein, UA Negative      Glucose, UA 4+ (*)     Ketones, UA Negative      Bilirubin (UA) Negative      Occult Blood UA Negative      Nitrite, UA Negative      Urobilinogen, UA Negative      Leukocytes, UA Negative      Narrative:     Specimen Source->Urine   URINALYSIS MICROSCOPIC    RBC, UA 0      WBC, UA 1      Bacteria None      Yeast, UA None      Squam Epithel, UA 0      Microscopic Comment SEE COMMENT      Narrative:     Specimen Source->Urine          Imaging Results              CT Abdomen Pelvis  Without Contrast (Final result)  Result time 02/19/25 13:46:06      Final result by Hernan Thomson Jr., MD (02/19/25 13:46:06)                   Impression:      No solid organ contusion, laceration or hemorrhage is demonstrated.  No fractures of the pelvis lumbar spine, thoracic spine or ribs is identified.  Free fluid or free air in the peritoneum is not seen.    Patient has polycystic kidney disease bilaterally.  Mild prostate hypertrophy.      Electronically signed by: Hernan Thomson MD  Date:    02/19/2025  Time:    13:46               Narrative:    EXAMINATION:  CT ABDOMEN PELVIS WITHOUT CONTRAST    CLINICAL HISTORY:  Abdominal trauma, blunt;    TECHNIQUE:  Low dose axial images, sagittal and coronal reformations were obtained from the lung bases to the pubic symphysis.    COMPARISON:  CT Uro urogram of November 7, 2023 and MRI abdomen of January 2, 2025.    FINDINGS:  The liver is of normal size contour and CT density without a focal mass seen.  The gallbladder is of normal size without CT evidence of stone.  The common bile duct is not dilated.  The pancreas is of normal contour and CT density without edema or mass.  The spleen is of normal size and CT density.    The adrenal glands are not enlarged.  There are too numerous to count cysts identified in each kidney.  These measure water density and higher with some obvious bloody cysts noted more prominent on  the left than on the right.  Hydronephrosis is not seen and a stone is not noted.  Measuring the kidneys at the same level utilized during the prior MRI of January 2, 2025 the right kidney measures 7.3 x 6.1 x 9.4 cm for a calculated volume of 219 cc..  The left kidney measures 9.4 x 7.3 x  6.5 cm for a calculated volume of 233 cc.  Tracey nephric hemorrhage is not seen.  Retroperitoneal hemorrhage is not identified and the abdominal aorta is of normal caliber.    On bone windows a fracture of the hips, pelvis, sacrum, lumbar vertebra, lower thoracic vertebra or ribs is not demonstrated.    The stomach is of normal configuration.  Small bowel dilatation or air-fluid levels are not seen.  The colon is of normal configuration without distention or mass.  A normal appendix is seen.  Free fluid or free air in the peritoneum is not noted.    The bladder is of normal contour.  The prostate is enlarged measuring 5.2 cm transversely.                                       Medications   HYDROcodone-acetaminophen 5-325 mg per tablet 1 tablet (1 tablet Oral Given 2/19/25 1333)     Medical Decision Making  64-year-old male with polycystic kidney disease presents to the emergency room with right-sided lower rib and back pain after mechanical fall several weeks ago.  CT is unremarkable.  Pain is better with hydrocodone.  Pain is likely musculoskeletal.  No indication of any significant injury.  Follow up primary care if symptoms persist.    Amount and/or Complexity of Data Reviewed  Labs:  Decision-making details documented in ED Course.  Radiology: ordered. Decision-making details documented in ED Course.    Risk  Prescription drug management.               ED Course as of 02/19/25 1453   Wed Feb 19, 2025   1355 CT Abdomen Pelvis  Without Contrast [EF]   1400 NITRITE UA: Negative [EF]   1400 Leukocyte Esterase, UA: Negative [EF]      ED Course User Index  [EF] Parviz Ross MD                           Clinical  Impression:  Final diagnoses:  [S20.221A] Back contusion, right, initial encounter (Primary)          ED Disposition Condition    Discharge Stable          ED Prescriptions       Medication Sig Dispense Start Date End Date Auth. Provider    HYDROcodone-acetaminophen (NORCO) 5-325 mg per tablet Take 1 tablet by mouth every 6 (six) hours as needed for Pain. 10 tablet 2/19/2025 -- Parviz Ross MD          Follow-up Information       Follow up With Specialties Details Why Contact Info Additional Information    Lico Meza MD Family Medicine, Hospitalist  As needed [address] [place] [address]  [phone]       [facility] Emergency Medicine  As needed [address] [place] [address] [address] [facility] [id number] 1st floor               [1]   Social History  Tobacco Use    Smoking status: Never    Smokeless tobacco: Never   Substance Use Topics    Alcohol use: Yes     Alcohol/week: 2.0 standard drinks of alcohol     Types: 2 Cans of beer per week     Comment: occasionally    Drug use: Not Currently        Parviz Ross MD  02/19/25 [id number]

## 2025-03-09 ENCOUNTER — TELEPHONE (OUTPATIENT)
Dept: PHARMACY | Facility: CLINIC | Age: 65
End: 2025-03-09
Payer: COMMERCIAL

## 2025-03-09 NOTE — TELEPHONE ENCOUNTER
[facility]/Population Health Chart Review & Patient Outreach Details For Medication Adherence Project    Reason for Outreach Encounter: 3rd Party payor non-compliance report ([employer], [employer], [employer], etc)  2.  Patient Outreach Method: Reviewed Patient Chart  3.   Medication in question: lisinopril    LAST FILLED: 2/14/25 for 30 day supply  Hypertension Medications              lisinopriL 10 MG tablet Take 10 mg by mouth.              4.  Reviewed and or Updates Made To: Patient Chart  5. Outreach Outcomes and/or actions taken: Patient filled medication and is on track to be adherent

## 2025-03-26 ENCOUNTER — PATIENT MESSAGE (OUTPATIENT)
Dept: PRIMARY CARE CLINIC | Facility: CLINIC | Age: 65
End: 2025-03-26
Payer: COMMERCIAL

## 2025-03-26 DIAGNOSIS — Z13.6 ENCOUNTER FOR SCREENING FOR CARDIOVASCULAR DISORDERS: ICD-10-CM

## 2025-03-26 DIAGNOSIS — I10 ESSENTIAL (PRIMARY) HYPERTENSION: ICD-10-CM

## 2025-03-26 DIAGNOSIS — Z51.81 MEDICATION MONITORING ENCOUNTER: Primary | ICD-10-CM

## 2025-03-26 DIAGNOSIS — M25.512 CHRONIC LEFT SHOULDER PAIN: ICD-10-CM

## 2025-03-26 DIAGNOSIS — Z11.4 ENCOUNTER FOR SCREENING FOR HIV: ICD-10-CM

## 2025-03-26 DIAGNOSIS — G89.29 CHRONIC LEFT-SIDED LOW BACK PAIN WITHOUT SCIATICA: ICD-10-CM

## 2025-03-26 DIAGNOSIS — R73.03 PREDIABETES: ICD-10-CM

## 2025-03-26 DIAGNOSIS — G89.29 CHRONIC LEFT SHOULDER PAIN: ICD-10-CM

## 2025-03-26 DIAGNOSIS — Z11.59 NEED FOR HEPATITIS C SCREENING TEST: ICD-10-CM

## 2025-03-26 DIAGNOSIS — M54.50 CHRONIC LEFT-SIDED LOW BACK PAIN WITHOUT SCIATICA: ICD-10-CM

## 2025-03-26 RX ORDER — DICLOFENAC SODIUM 50 MG/1
50 TABLET, DELAYED RELEASE ORAL 3 TIMES DAILY PRN
Qty: 60 TABLET | Refills: 0 | Status: SHIPPED | OUTPATIENT
Start: 2025-03-26

## 2025-03-26 NOTE — TELEPHONE ENCOUNTER
Care Due:                  Date            Visit Type   Department     Provider  --------------------------------------------------------------------------------                                NP -                              PRIMARY      ARACELI [facility]  Last Visit: 11-      CARE ([facility])   PRIMARY CARE   Lico Meza  Next Visit: None Scheduled  None         None Found                                                            Last  Test          Frequency    Reason                     Performed    Due Date  --------------------------------------------------------------------------------    CBC.........  12 months..  diclofenac...............  06-   06-    [facility] Embedded Care Due Messages. Reference number: [id number].   3/26/2025 8:41:00 AM CDT

## 2025-04-10 ENCOUNTER — TELEPHONE (OUTPATIENT)
Dept: PHARMACY | Facility: CLINIC | Age: 65
End: 2025-04-10
Payer: COMMERCIAL

## 2025-04-10 NOTE — TELEPHONE ENCOUNTER
[facility]/Population Health Chart Review & Patient Outreach Details For Medication Adherence Project    Reason for Outreach Encounter: 3rd Party payor non-compliance report ([employer], [employer], [employer], etc)  2.  Patient Outreach Method: Reviewed Patient Chart  3.   Medication in question: lisinopril   LAST FILLED: 3/17/25 for 30 day supply  Hypertension Medications              lisinopriL 10 MG tablet Take 10 mg by mouth.              4.  Reviewed and or Updates Made To: Patient Chart  5. Outreach Outcomes and/or actions taken: Patient filled medication and is on track to be adherent

## 2025-04-21 DIAGNOSIS — G89.29 CHRONIC LEFT-SIDED LOW BACK PAIN WITHOUT SCIATICA: ICD-10-CM

## 2025-04-21 DIAGNOSIS — M54.50 CHRONIC LEFT-SIDED LOW BACK PAIN WITHOUT SCIATICA: ICD-10-CM

## 2025-04-21 DIAGNOSIS — M25.512 CHRONIC LEFT SHOULDER PAIN: ICD-10-CM

## 2025-04-21 DIAGNOSIS — G89.29 CHRONIC LEFT SHOULDER PAIN: ICD-10-CM

## 2025-04-21 RX ORDER — DICLOFENAC SODIUM 50 MG/1
50 TABLET, DELAYED RELEASE ORAL 3 TIMES DAILY PRN
Qty: 60 TABLET | Refills: 0 | Status: SHIPPED | OUTPATIENT
Start: 2025-04-21

## 2025-04-21 NOTE — TELEPHONE ENCOUNTER
No care due was identified.  [facility] Embedded Care Due Messages. Reference number: [id number].   4/21/2025 8:47:56 AM CDT

## 2025-05-01 DIAGNOSIS — K29.60 REFLUX GASTRITIS: ICD-10-CM

## 2025-05-01 RX ORDER — FAMOTIDINE 20 MG/1
20 TABLET, FILM COATED ORAL NIGHTLY PRN
Qty: 30 TABLET | Refills: 0 | Status: SHIPPED | OUTPATIENT
Start: 2025-05-01

## 2025-05-01 NOTE — TELEPHONE ENCOUNTER
No care due was identified.  [facility] Embedded Care Due Messages. Reference number: [id number].   5/01/2025 9:13:56 AM CDT

## 2025-05-01 NOTE — TELEPHONE ENCOUNTER
Refill Routing Note   Medication(s) are not appropriate for processing by [facility] for the following reason(s):        ED/Hospital Visit since last OV with provider  Required labs abnormal  Outside of protocol    [facility] action(s):  Route        Medication Therapy Plan: PRN USE OF PPI IS OOP FOR [facility]      Appointments  past 12m or future 3m with PCP    Date Provider   Last Visit   11/20/2024 Lico Meza MD   Next Visit   Visit date not found Lico Meza MD   ED visits in past 90 days: 1        Note composed:9:58 AM 05/01/2025

## 2025-07-05 DIAGNOSIS — K29.60 REFLUX GASTRITIS: ICD-10-CM

## 2025-07-05 NOTE — TELEPHONE ENCOUNTER
No care due was identified.  [facility] Embedded Care Due Messages. Reference number: [id number].   7/05/2025 8:50:35 AM CDT

## 2025-07-07 RX ORDER — FAMOTIDINE 20 MG/1
20 TABLET, FILM COATED ORAL NIGHTLY PRN
Qty: 30 TABLET | Refills: 0 | Status: SHIPPED | OUTPATIENT
Start: 2025-07-07

## 2025-08-04 DIAGNOSIS — K29.60 REFLUX GASTRITIS: ICD-10-CM

## 2025-08-04 RX ORDER — FAMOTIDINE 20 MG/1
20 TABLET, FILM COATED ORAL NIGHTLY PRN
Qty: 30 TABLET | Refills: 0 | Status: SHIPPED | OUTPATIENT
Start: 2025-08-04

## 2025-08-04 NOTE — TELEPHONE ENCOUNTER
No care due was identified.  [facility] Embedded Care Due Messages. Reference number: [id number].   8/04/2025 8:41:55 AM CDT

## 2025-08-20 ENCOUNTER — TELEPHONE (OUTPATIENT)
Dept: NEUROSURGERY | Facility: CLINIC | Age: 65
End: 2025-08-20

## 2025-09-03 DIAGNOSIS — K29.60 REFLUX GASTRITIS: ICD-10-CM

## 2025-09-03 RX ORDER — FAMOTIDINE 20 MG/1
20 TABLET, FILM COATED ORAL NIGHTLY PRN
Qty: 30 TABLET | Refills: 0 | Status: SHIPPED | OUTPATIENT
Start: 2025-09-03

## 2025-09-04 ENCOUNTER — OFFICE VISIT (OUTPATIENT)
Dept: ENDOCRINOLOGY | Facility: CLINIC | Age: 65
End: 2025-09-04
Payer: MEDICARE

## 2025-09-04 VITALS
WEIGHT: 217.63 LBS | TEMPERATURE: 99 F | DIASTOLIC BLOOD PRESSURE: 70 MMHG | SYSTOLIC BLOOD PRESSURE: 108 MMHG | HEIGHT: 69 IN | HEART RATE: 66 BPM | BODY MASS INDEX: 32.24 KG/M2 | OXYGEN SATURATION: 96 %

## 2025-09-04 DIAGNOSIS — E11.69 COMBINED HYPERLIPIDEMIA ASSOCIATED WITH TYPE 2 DIABETES MELLITUS: ICD-10-CM

## 2025-09-04 DIAGNOSIS — I15.2 HYPERTENSION ASSOCIATED WITH TYPE 2 DIABETES MELLITUS: ICD-10-CM

## 2025-09-04 DIAGNOSIS — E66.9 OBESITY (BMI 30-39.9): ICD-10-CM

## 2025-09-04 DIAGNOSIS — E78.2 COMBINED HYPERLIPIDEMIA ASSOCIATED WITH TYPE 2 DIABETES MELLITUS: ICD-10-CM

## 2025-09-04 DIAGNOSIS — E11.59 HYPERTENSION ASSOCIATED WITH TYPE 2 DIABETES MELLITUS: ICD-10-CM

## 2025-09-04 DIAGNOSIS — E11.65 TYPE 2 DIABETES MELLITUS WITH HYPERGLYCEMIA, WITHOUT LONG-TERM CURRENT USE OF INSULIN: Primary | ICD-10-CM

## 2025-09-04 DIAGNOSIS — N20.0 RENAL STONES: ICD-10-CM

## 2025-09-04 PROCEDURE — 99214 OFFICE O/P EST MOD 30 MIN: CPT | Mod: PBBFAC,PO | Performed by: PHYSICIAN ASSISTANT

## 2025-09-04 PROCEDURE — 99999 PR PBB SHADOW E&M-EST. PATIENT-LVL IV: CPT | Mod: PBBFAC,,, | Performed by: PHYSICIAN ASSISTANT

## 2025-09-04 RX ORDER — ALLOPURINOL 100 MG/1
100 TABLET ORAL DAILY
Qty: 90 TABLET | Refills: 3 | Status: SHIPPED | OUTPATIENT
Start: 2025-09-04

## 2025-09-04 RX ORDER — EZETIMIBE 10 MG/1
10 TABLET ORAL DAILY
Qty: 90 TABLET | Refills: 3 | Status: SHIPPED | OUTPATIENT
Start: 2025-09-04 | End: 2026-09-04

## 2025-09-04 RX ORDER — DAPAGLIFLOZIN 10 MG/1
10 TABLET, FILM COATED ORAL DAILY
Qty: 90 TABLET | Refills: 3 | Status: SHIPPED | OUTPATIENT
Start: 2025-09-04

## (undated) DEVICE — SET CYSTO IRR DRP CHMBR 84IN

## (undated) DEVICE — SLEEVE SCD EXPRESS KNEE MEDIUM

## (undated) DEVICE — BLADE MAKO NARROW

## (undated) DEVICE — DRAPE STERI U-SHAPED 47X51IN

## (undated) DEVICE — WRAP PROTECTIVE LEG POS STRL

## (undated) DEVICE — KIT TRIATHLON CR TIB PREP SZ4

## (undated) DEVICE — KIT TRIATHLON TIBIAL SIZER

## (undated) DEVICE — SOL NACL IRR 1000ML BTL

## (undated) DEVICE — SYR 50CC LL

## (undated) DEVICE — TOURNIQUET SB QC DP 34X4IN

## (undated) DEVICE — PACK SIRUS BASIC V SURG STRL

## (undated) DEVICE — BLADE SURG CARBON STEEL SZ11

## (undated) DEVICE — GLOVE BIOGEL PI MICRO INDIC 7

## (undated) DEVICE — NDL SPINAL 18GX3.5 SPINOCAN

## (undated) DEVICE — DRESSING GAUZE OIL EMUL 3X8

## (undated) DEVICE — DRAPE MINOR FEN 98X77X121IN

## (undated) DEVICE — SCRUB 10% POVIDONE IODINE 4OZ

## (undated) DEVICE — TOWEL OR DISP STRL BLUE 4/PK

## (undated) DEVICE — DRAPE STERI INSTRUMENT 1018

## (undated) DEVICE — GLOVE SURG ULTRA TOUCH 8

## (undated) DEVICE — PADDING CAST SPECIALIST 6X4YD

## (undated) DEVICE — UNDERGLOVES BIOGEL PI SIZE 8

## (undated) DEVICE — SPONGE GAUZE 16PLY 4X4

## (undated) DEVICE — SPONGE BULKEE II ABSRB 6X6.75

## (undated) DEVICE — BLADE SAG DUAL 18MMX1.27MMX90M

## (undated) DEVICE — DRAPE ORTH SPLIT 77X108IN

## (undated) DEVICE — CLIP IRRIGATION MAKO

## (undated) DEVICE — DRAPE MEDIUM SHEET 40X70IN

## (undated) DEVICE — Device

## (undated) DEVICE — KIT TRIATHLON CR FEM PREP SZ4

## (undated) DEVICE — PADDING WYTEX UNDRCST 6INX4YD

## (undated) DEVICE — BLADE TONGUE DEPRESSOR STRL

## (undated) DEVICE — GLOVE SENSICARE PI ALOE 7

## (undated) DEVICE — KIT DRAPE RIO ONE PIECE W/POCK

## (undated) DEVICE — ELECTRODE REM PLYHSV RETURN 9

## (undated) DEVICE — UNDERGLOVES BIOGEL PI SIZE 7.5

## (undated) DEVICE — COVER TABLE 44X90 STERILE

## (undated) DEVICE — WRAP KNEE ACCU THERM GEL PACK

## (undated) DEVICE — ADHESIVE DERMABOND ADVANCED

## (undated) DEVICE — BLADE SURG CARBON STEEL #10

## (undated) DEVICE — SUT STRATAFIX SPRL PS-2 3-0

## (undated) DEVICE — GLOVE SURGEONS ULTRA TOUCH 6.5

## (undated) DEVICE — PIN BONE 4 X 140MM STERILE
Type: IMPLANTABLE DEVICE | Site: KNEE | Status: NON-FUNCTIONAL
Removed: 2023-06-27

## (undated) DEVICE — INTERPULSE SET

## (undated) DEVICE — SOL NACL IRR 3000ML

## (undated) DEVICE — SUT 2/0 18IN COATED VICRYL

## (undated) DEVICE — KIT CHECKPOINT MAKO

## (undated) DEVICE — BANDAGE MATRIX HK LOOP 6IN 5YD

## (undated) DEVICE — LINER SUCTION 3000CC

## (undated) DEVICE — GLOVE SURG ULTRA TOUCH 7.5

## (undated) DEVICE — APPLICATOR CHLORAPREP ORN 26ML

## (undated) DEVICE — PIN BONE 3.2X110MM
Type: IMPLANTABLE DEVICE | Site: KNEE | Status: NON-FUNCTIONAL
Removed: 2023-06-27

## (undated) DEVICE — YANKAUER FLEX NO VENT HI CAP

## (undated) DEVICE — GLOVE BIOGEL PIMICRO INDIC 6.5

## (undated) DEVICE — DRAPE INCISE IOBAN 2 23X17IN

## (undated) DEVICE — STRAP OR TABLE 5IN X 72IN

## (undated) DEVICE — KIT VIZADISC KNEE TRACKING

## (undated) DEVICE — BLADE MAKO STANDARD

## (undated) DEVICE — PACK CUSTOM UNIV BASIN SLI

## (undated) DEVICE — SUT STRATAFIX PDS 1 CTX 18IN

## (undated) DEVICE — GOWN TOGA SYS PEELWY ZIP 2 XL

## (undated) DEVICE — BANDAGE ESMARK ELASTIC ST 6X9

## (undated) DEVICE — CATH URETHRAL RED RUBBER 18FR

## (undated) DEVICE — MANIFOLD 4 PORT

## (undated) DEVICE — ALCOHOL 70% ISOP RUBBING 4OZ

## (undated) DEVICE — SET DECANTER MEDICHOICE

## (undated) DEVICE — DRAPE THREE-QTR REINF 53X77IN

## (undated) DEVICE — PAD ABDOMINAL STERILE 8X10IN

## (undated) DEVICE — TOGA FLYTE PEEL AWAY XLARGE

## (undated) DEVICE — PACK LOWER EXTREMITY

## (undated) DEVICE — BNDG COFLEX FOAM LF2 ST 6X5YD